# Patient Record
Sex: FEMALE | Race: WHITE | Employment: UNEMPLOYED | URBAN - METROPOLITAN AREA
[De-identification: names, ages, dates, MRNs, and addresses within clinical notes are randomized per-mention and may not be internally consistent; named-entity substitution may affect disease eponyms.]

---

## 2018-10-11 ENCOUNTER — OFFICE VISIT (OUTPATIENT)
Dept: FAMILY MEDICINE CLINIC | Age: 23
End: 2018-10-11
Payer: COMMERCIAL

## 2018-10-11 VITALS
HEART RATE: 85 BPM | OXYGEN SATURATION: 97 % | SYSTOLIC BLOOD PRESSURE: 98 MMHG | WEIGHT: 172 LBS | BODY MASS INDEX: 26.07 KG/M2 | DIASTOLIC BLOOD PRESSURE: 66 MMHG | HEIGHT: 68 IN

## 2018-10-11 DIAGNOSIS — K92.1 BLOOD IN STOOL: Primary | ICD-10-CM

## 2018-10-11 DIAGNOSIS — R42 DIZZINESS: ICD-10-CM

## 2018-10-11 DIAGNOSIS — Z13.220 SCREENING CHOLESTEROL LEVEL: ICD-10-CM

## 2018-10-11 DIAGNOSIS — Z23 IMMUNIZATION DUE: ICD-10-CM

## 2018-10-11 DIAGNOSIS — K58.2 IRRITABLE BOWEL SYNDROME WITH BOTH CONSTIPATION AND DIARRHEA: ICD-10-CM

## 2018-10-11 DIAGNOSIS — K92.1 BLOOD IN STOOL: ICD-10-CM

## 2018-10-11 LAB
A/G RATIO: 1.7 (ref 1.1–2.2)
ALBUMIN SERPL-MCNC: 4.3 G/DL (ref 3.4–5)
ALP BLD-CCNC: 73 U/L (ref 40–129)
ALT SERPL-CCNC: 9 U/L (ref 10–40)
ANION GAP SERPL CALCULATED.3IONS-SCNC: 12 MMOL/L (ref 3–16)
AST SERPL-CCNC: 13 U/L (ref 15–37)
BASOPHILS ABSOLUTE: 0.1 K/UL (ref 0–0.2)
BASOPHILS RELATIVE PERCENT: 0.7 %
BILIRUB SERPL-MCNC: 0.3 MG/DL (ref 0–1)
BUN BLDV-MCNC: 9 MG/DL (ref 7–20)
CALCIUM SERPL-MCNC: 9.4 MG/DL (ref 8.3–10.6)
CHLORIDE BLD-SCNC: 100 MMOL/L (ref 99–110)
CHOLESTEROL, FASTING: 140 MG/DL (ref 0–199)
CO2: 25 MMOL/L (ref 21–32)
CREAT SERPL-MCNC: 0.5 MG/DL (ref 0.6–1.1)
EOSINOPHILS ABSOLUTE: 0.2 K/UL (ref 0–0.6)
EOSINOPHILS RELATIVE PERCENT: 2.4 %
GFR AFRICAN AMERICAN: >60
GFR NON-AFRICAN AMERICAN: >60
GLOBULIN: 2.5 G/DL
GLUCOSE BLD-MCNC: 79 MG/DL (ref 70–99)
HCT VFR BLD CALC: 39.8 % (ref 36–48)
HDLC SERPL-MCNC: 44 MG/DL (ref 40–60)
HEMOGLOBIN: 13.1 G/DL (ref 12–16)
LDL CHOLESTEROL CALCULATED: 82 MG/DL
LYMPHOCYTES ABSOLUTE: 2.4 K/UL (ref 1–5.1)
LYMPHOCYTES RELATIVE PERCENT: 29.9 %
MCH RBC QN AUTO: 28.4 PG (ref 26–34)
MCHC RBC AUTO-ENTMCNC: 32.9 G/DL (ref 31–36)
MCV RBC AUTO: 86.1 FL (ref 80–100)
MONOCYTES ABSOLUTE: 0.5 K/UL (ref 0–1.3)
MONOCYTES RELATIVE PERCENT: 5.9 %
NEUTROPHILS ABSOLUTE: 4.8 K/UL (ref 1.7–7.7)
NEUTROPHILS RELATIVE PERCENT: 61.1 %
PDW BLD-RTO: 12.9 % (ref 12.4–15.4)
PLATELET # BLD: 277 K/UL (ref 135–450)
PMV BLD AUTO: 8.6 FL (ref 5–10.5)
POTASSIUM SERPL-SCNC: 4.3 MMOL/L (ref 3.5–5.1)
RBC # BLD: 4.62 M/UL (ref 4–5.2)
SODIUM BLD-SCNC: 137 MMOL/L (ref 136–145)
TOTAL PROTEIN: 6.8 G/DL (ref 6.4–8.2)
TRIGLYCERIDE, FASTING: 68 MG/DL (ref 0–150)
TSH SERPL DL<=0.05 MIU/L-ACNC: 1.98 UIU/ML (ref 0.27–4.2)
VLDLC SERPL CALC-MCNC: 14 MG/DL
WBC # BLD: 7.9 K/UL (ref 4–11)

## 2018-10-11 PROCEDURE — 90688 IIV4 VACCINE SPLT 0.5 ML IM: CPT | Performed by: NURSE PRACTITIONER

## 2018-10-11 PROCEDURE — 90715 TDAP VACCINE 7 YRS/> IM: CPT | Performed by: NURSE PRACTITIONER

## 2018-10-11 PROCEDURE — 99203 OFFICE O/P NEW LOW 30 MIN: CPT | Performed by: NURSE PRACTITIONER

## 2018-10-11 PROCEDURE — 90471 IMMUNIZATION ADMIN: CPT | Performed by: NURSE PRACTITIONER

## 2018-10-11 PROCEDURE — 90472 IMMUNIZATION ADMIN EACH ADD: CPT | Performed by: NURSE PRACTITIONER

## 2018-10-11 RX ORDER — DICYCLOMINE HCL 20 MG
20 TABLET ORAL 3 TIMES DAILY PRN
Qty: 120 TABLET | Refills: 3 | Status: SHIPPED | OUTPATIENT
Start: 2018-10-11 | End: 2019-01-15 | Stop reason: ALTCHOICE

## 2018-10-11 ASSESSMENT — ENCOUNTER SYMPTOMS
ABDOMINAL PAIN: 0
ANAL BLEEDING: 0
RECTAL PAIN: 0
NAUSEA: 0
CONSTIPATION: 1
STRIDOR: 0
DIARRHEA: 1
WHEEZING: 0
COUGH: 0
SHORTNESS OF BREATH: 0
CHOKING: 0
BLOOD IN STOOL: 1

## 2018-10-11 ASSESSMENT — PATIENT HEALTH QUESTIONNAIRE - PHQ9
SUM OF ALL RESPONSES TO PHQ9 QUESTIONS 1 & 2: 0
1. LITTLE INTEREST OR PLEASURE IN DOING THINGS: 0
SUM OF ALL RESPONSES TO PHQ QUESTIONS 1-9: 0
SUM OF ALL RESPONSES TO PHQ QUESTIONS 1-9: 0
2. FEELING DOWN, DEPRESSED OR HOPELESS: 0

## 2018-10-11 NOTE — PROGRESS NOTES
YRS AND OLDER, IM, MDV, 0.5ML ([facility])  - Tdap (age 6y and older) IM (Boostrix)    5. Screening cholesterol level  - Lipid, Fasting; Future      Patient Instructions   Blood work in [address]  Suggest trying Metamucil a few times weekly  Bentyl three times daily as needed for diarrhea  Follow up in 2 months to see how abdominal issues are going         Outpatient Encounter Prescriptions as of 10/11/2018   Medication Sig Dispense Refill    dicyclomine (BENTYL) 20 MG tablet Take 1 tablet by mouth 3 times daily as needed (diarrhea, cramping) 120 tablet 3     No facility-administered encounter medications on file as of 10/11/2018.       Danica Rosales, CNP

## 2018-10-17 ENCOUNTER — OFFICE VISIT (OUTPATIENT)
Dept: FAMILY MEDICINE CLINIC | Age: 23
End: 2018-10-17
Payer: COMMERCIAL

## 2018-10-17 VITALS
RESPIRATION RATE: 16 BRPM | TEMPERATURE: 98 F | OXYGEN SATURATION: 98 % | HEART RATE: 89 BPM | HEIGHT: 68 IN | SYSTOLIC BLOOD PRESSURE: 104 MMHG | DIASTOLIC BLOOD PRESSURE: 72 MMHG | BODY MASS INDEX: 26.15 KG/M2

## 2018-10-17 DIAGNOSIS — R68.89 FLU-LIKE SYMPTOMS: Primary | ICD-10-CM

## 2018-10-17 LAB — S PYO AG THROAT QL: NORMAL

## 2018-10-17 PROCEDURE — 87880 STREP A ASSAY W/OPTIC: CPT | Performed by: NURSE PRACTITIONER

## 2018-10-17 PROCEDURE — 99213 OFFICE O/P EST LOW 20 MIN: CPT | Performed by: NURSE PRACTITIONER

## 2018-10-17 ASSESSMENT — ENCOUNTER SYMPTOMS
SORE THROAT: 0
STRIDOR: 0
ANAL BLEEDING: 0
COUGH: 1
SINUS PAIN: 0
SINUS PRESSURE: 0
RECTAL PAIN: 0
ABDOMINAL DISTENTION: 0
RHINORRHEA: 0
NAUSEA: 0
CHOKING: 0
CHEST TIGHTNESS: 0
CONSTIPATION: 0
SHORTNESS OF BREATH: 1
WHEEZING: 0
DIARRHEA: 0
BACK PAIN: 0
BLOOD IN STOOL: 0
ABDOMINAL PAIN: 0
VOMITING: 0

## 2018-10-17 NOTE — PATIENT INSTRUCTIONS
Symptoms appear viral  Treat with fluids, rest and PRN ibuprofen/acetaminophen  Follow up if no improvement in 3-4 days or sooner if symptoms worsen

## 2019-01-14 ENCOUNTER — NURSE TRIAGE (OUTPATIENT)
Dept: OTHER | Facility: CLINIC | Age: 24
End: 2019-01-14

## 2019-01-15 ENCOUNTER — OFFICE VISIT (OUTPATIENT)
Dept: FAMILY MEDICINE CLINIC | Age: 24
End: 2019-01-15
Payer: COMMERCIAL

## 2019-01-15 VITALS
BODY MASS INDEX: 25.76 KG/M2 | HEIGHT: 68 IN | WEIGHT: 170 LBS | DIASTOLIC BLOOD PRESSURE: 72 MMHG | HEART RATE: 80 BPM | SYSTOLIC BLOOD PRESSURE: 98 MMHG | OXYGEN SATURATION: 97 %

## 2019-01-15 DIAGNOSIS — G44.209 ACUTE NON INTRACTABLE TENSION-TYPE HEADACHE: ICD-10-CM

## 2019-01-15 DIAGNOSIS — R42 DIZZINESS: Primary | ICD-10-CM

## 2019-01-15 PROCEDURE — 99213 OFFICE O/P EST LOW 20 MIN: CPT | Performed by: NURSE PRACTITIONER

## 2019-01-15 ASSESSMENT — ENCOUNTER SYMPTOMS
COLOR CHANGE: 0
SHORTNESS OF BREATH: 0
WHEEZING: 0
STRIDOR: 0
COUGH: 0

## 2019-04-15 ASSESSMENT — ENCOUNTER SYMPTOMS
EYE REDNESS: 0
CONSTIPATION: 0
SHORTNESS OF BREATH: 0
VOMITING: 0
NAUSEA: 0
DIARRHEA: 0

## 2019-04-16 NOTE — PROGRESS NOTES
04/17/19      Samir January  See HPI for CC    HPI  Here because she is \"having panic attacks\"  New, on no medication for it. For months  Getting worse with time  intermittent attacks  Has less than 3 last month, 8 total over 6 months  To the extreme and then she has to pull over when she is driving. They become more frequent over the past 2 months. It all started to get worse after she had some trauma in her memory and she has been seeing counseling and trying exercises at she is concerned that perhaps that she needs medication. Everyone in her family is on Prozac which seems to help them. That she doesn't want to take medication every day. No thoughts of suicide or harming others. She does have irritable bowel syndrome  Past Medical History:   Diagnosis Date    Irritable bowel syndrome with both constipation and diarrhea 10/11/2018    Low plasma von Willebrand factor (vWF)      Social History     Socioeconomic History    Marital status:      Spouse name: Not on file    Number of children: Not on file    Years of education: Not on file    Highest education level: Not on file   Occupational History    Not on file   Social Needs    Financial resource strain: Not on file    Food insecurity:     Worry: Not on file     Inability: Not on file    Transportation needs:     Medical: Not on file     Non-medical: Not on file   Tobacco Use    Smoking status: Never Smoker    Smokeless tobacco: Never Used   Substance and Sexual Activity    Alcohol use:  Yes    Drug use: No    Sexual activity: Yes   Lifestyle    Physical activity:     Days per week: Not on file     Minutes per session: Not on file    Stress: Not on file   Relationships    Social connections:     Talks on phone: Not on file     Gets together: Not on file     Attends Zoroastrian service: Not on file     Active member of club or organization: Not on file     Attends meetings of clubs or organizations: Not on file     Relationship status: Not on file    Intimate partner violence:     Fear of current or ex partner: Not on file     Emotionally abused: Not on file     Physically abused: Not on file     Forced sexual activity: Not on file   Other Topics Concern    Not on file   Social History Narrative    Not on file           Review of Systems   Constitutional: Negative for unexpected weight change. HENT: Negative. Eyes: Negative for redness. Respiratory: Negative for shortness of breath. Cardiovascular: Negative for chest pain and leg swelling. Gastrointestinal: Negative for constipation, diarrhea, nausea and vomiting. Skin: Negative for pallor. Allergic/Immunologic: Negative for immunocompromised state. Psychiatric/Behavioral: Negative for confusion. The patient is nervous/anxious. All other systems reviewed and are negative. No current outpatient medications on file. No current facility-administered medications for this visit. Vitals:    04/17/19 0804   BP: 114/76   Pulse: 95   SpO2: 97%         Physical Exam  Physical Exam   Constitutional: She is oriented to person, place, and time. She appears well-developed and well-nourished. No distress. HENT:   Head: Normocephalic and atraumatic. Eyes: Conjunctivae are normal. No scleral icterus. Cardiovascular: Normal rate. Pulmonary/Chest: Effort normal. No stridor. Musculoskeletal: She exhibits no edema. Neurological: She is alert and oriented to person, place, and time. Skin: Skin is warm. She is not diaphoretic. No erythema. Psychiatric: Her behavior is normal. Judgment and thought content normal. Her mood appears anxious. Her speech is not rapid and/or pressured and not delayed. She is not slowed. And worried        Diagnosis Orders   1. Panic attacks       With I underlying GA  Spent 20 minutes with pt, >50% of time discussing diagnosis, prognosis, risk and benefits of treatment, treatment plan, side effects, and answered questions.   She is unable to decide exactly what she wants to do this for his medication as she definitely should continue with her therapy. And therapist. She will think about it and the me know whether she wants hydroxyzine or Prozac or even Zoloft and the present cons were discussed on each of these. An electronic signature was used to authenticate this note. This was dictated. Errors mightbe possible due to dictation.   --Lisa Yost, DO

## 2019-04-17 ENCOUNTER — PATIENT MESSAGE (OUTPATIENT)
Dept: FAMILY MEDICINE CLINIC | Age: 24
End: 2019-04-17

## 2019-04-17 ENCOUNTER — OFFICE VISIT (OUTPATIENT)
Dept: FAMILY MEDICINE CLINIC | Age: 24
End: 2019-04-17
Payer: COMMERCIAL

## 2019-04-17 VITALS
HEART RATE: 95 BPM | HEIGHT: 68 IN | DIASTOLIC BLOOD PRESSURE: 76 MMHG | OXYGEN SATURATION: 97 % | WEIGHT: 168 LBS | BODY MASS INDEX: 25.46 KG/M2 | SYSTOLIC BLOOD PRESSURE: 114 MMHG

## 2019-04-17 DIAGNOSIS — F41.0 PANIC ATTACKS: Primary | ICD-10-CM

## 2019-04-17 PROCEDURE — 99214 OFFICE O/P EST MOD 30 MIN: CPT | Performed by: FAMILY MEDICINE

## 2019-04-17 RX ORDER — FLUOXETINE HYDROCHLORIDE 20 MG/1
20 CAPSULE ORAL DAILY
Qty: 90 CAPSULE | Refills: 1 | Status: SHIPPED | OUTPATIENT
Start: 2019-04-17 | End: 2020-02-06

## 2019-04-17 ASSESSMENT — PATIENT HEALTH QUESTIONNAIRE - PHQ9
1. LITTLE INTEREST OR PLEASURE IN DOING THINGS: 0
SUM OF ALL RESPONSES TO PHQ9 QUESTIONS 1 & 2: 2
SUM OF ALL RESPONSES TO PHQ QUESTIONS 1-9: 2
2. FEELING DOWN, DEPRESSED OR HOPELESS: 2
SUM OF ALL RESPONSES TO PHQ QUESTIONS 1-9: 2

## 2019-04-17 NOTE — PATIENT INSTRUCTIONS
· Consider hydroxyzine as needed for panic or prozac or zoloft daily for prevention of panic attack. · Call and let me know if you want to begin either and I can send to your pharmacy. · Usually, starting dose and increase after 4-6 weeks on prozac or zoloft  · For hydroxyzine usually start at 25mg and increase as needed. ·           Use relaxation techniques. Sometimes we simply cannot control the chaos of life. But we can take charge of how we respond by learning to tap into our own internal relaxation response.   What does this mean? It means setting aside about 5 to 10 minutes (daily if possible) to practice one or more of the exercises listed below:  . Connect with Your Bodys Relaxation Response  . Lie comfortably on your back or find a comfortable and quiet place to sit. . Close your eyes and breathe gently and naturally through your nose. . Relax all your muscles, starting at your toes, and moving up to the top of your head. . Connect your awareness to your breath. Focus on how your body feels and responds as you fill and empty your lungs with air. . Continue for 5 to 20 minutes. Try to disconnect from any particular goal or agenda and just allow relaxation to occur at its own pace. When distracting thoughts occur, simply acknowledge them and then return to the breath, focusing on the sounds and sensations as you breathe. . When you are done, sit quietly for several minutes, at first with your eyes closed and then open your eyes. Wait a few minutes before standing up. . Do not worry about whether you are successful in achieving a deep level of relaxation. With practice, the relaxation response will likely come with less effort. . Deep Conscious Breathing  . Find a comfortable and quiet place to sit or lie down. If this is not possible, close your eyes and imagine yourself somewhere relaxing such as lying on the beach listening to ocean waves.   . Close and relax your eyes and relax your their weight. Consciously relax them and feel them release. Progress to your upper arms, shoulders, and neck. . Feel your mouth and jaw. Consciously relax them. Pay particular attention to your jaw muscles and unclench if you need to. Feel your mouth and jaw relax. . Feel your eyes, cheeks, and forehead. Sense if there is tension. Consciously relax your eyelids and the skin on your cheeks and forehead, and feel the tension release. . Mentally scan your body. If you find any place that is still tense, then consciously relax that place and let it release. Repeat again as needed--starting with the tips of your toes and then working upward. Guided Imagery--Visualizing Yourself in a Peaceful Setting? . Sit in a comfortable chair or lie comfortably on your back with your eyes closed and relaxed. . Imagine yourself in a favorite, peaceful place. The place may be on a [place] beach with the ocean breezes caressing you, swinging in a hammock in the mountains, or in your own backyard. Any place that you find peaceful and relaxing is OK. . See and feel the surroundings in your peaceful place, hear the peaceful sounds, smell the scents, feel the warmth of the sun or the cool of the breeze and any other sensations that you find. Relax and enjoy it. . You can return to this place any time or during the night when trying to sleep. As you use this place more and more, you will find it easier to relax or fall asleep as this imagery becomes a relaxation conditioner. You may complete this exercise on its own or you can use this exercise after completing any of the other relaxation exercises.

## 2019-04-17 NOTE — TELEPHONE ENCOUNTER
From: Elsa  To: Jay Freeman DO  Sent: 4/17/2019 9:53 AM EDT  Subject: Prescription Question    Good morning,    Following my visit this morning, I spoke with my family and therapist. We are all in agreement that it would be beneficial to move forward with Prozac to treat my anxiety and panic. Thank you!   Elsa

## 2019-05-23 RX ORDER — HYDROXYZINE HYDROCHLORIDE 25 MG/1
25 TABLET, FILM COATED ORAL DAILY PRN
Qty: 30 TABLET | Refills: 0 | Status: SHIPPED | OUTPATIENT
Start: 2019-05-23 | End: 2019-06-22

## 2019-08-01 ENCOUNTER — OFFICE VISIT (OUTPATIENT)
Dept: FAMILY MEDICINE CLINIC | Age: 24
End: 2019-08-01
Payer: COMMERCIAL

## 2019-08-01 VITALS
HEIGHT: 68 IN | OXYGEN SATURATION: 98 % | BODY MASS INDEX: 26.07 KG/M2 | HEART RATE: 72 BPM | SYSTOLIC BLOOD PRESSURE: 98 MMHG | DIASTOLIC BLOOD PRESSURE: 70 MMHG | WEIGHT: 172 LBS

## 2019-08-01 DIAGNOSIS — R53.83 FATIGUE, UNSPECIFIED TYPE: Primary | ICD-10-CM

## 2019-08-01 DIAGNOSIS — F41.1 GAD (GENERALIZED ANXIETY DISORDER): ICD-10-CM

## 2019-08-01 LAB
CONTROL: NORMAL
PREGNANCY TEST URINE, POC: NORMAL

## 2019-08-01 PROCEDURE — 81025 URINE PREGNANCY TEST: CPT | Performed by: NURSE PRACTITIONER

## 2019-08-01 PROCEDURE — 99213 OFFICE O/P EST LOW 20 MIN: CPT | Performed by: NURSE PRACTITIONER

## 2019-08-01 NOTE — PROGRESS NOTES
08/01/2019    Chief Complaint   Patient presents with    Fatigue     x 2 .5 wks, headaches. HPI: Trevin Jimenez is a 21 y.o. female who presents with complaints of fatigue and headaches. Fatigue started 2.5 weeks ago, has missed a day of work and gone into work late one day because she feels so exhausted. Her brother has mono, she was seen at health clinic at her place of work and was tested for mono which was negative. She does not live with brother but states \"its not unusual for me to go to my parents house (where her brother lives) and sometimes drink after him. \" Pt had stopped taking Prozac and was taking inconsistently for the past week or so she has been taking medication regularly without improvement in her fatigue. Headaches are mild, tension type headaches. Denies abdominal pain, sore throat, vomiting. Pt is , is on day 23 of menstrual cycle. Not on OCP but uses condoms mostly. Past Medical History:   Diagnosis Date    GA (generalized anxiety disorder) 8/2/2019    Irritable bowel syndrome with both constipation and diarrhea 10/11/2018    Low plasma von Willebrand factor (vWF)        Past Surgical History:   Procedure Laterality Date    BREAST REDUCTION SURGERY         Social History     Tobacco Use    Smoking status: Never Smoker    Smokeless tobacco: Never Used   Substance Use Topics    Alcohol use: Yes    Drug use: No       Family History   Problem Relation Age of Onset    High Blood Pressure Father     Cancer Brother         Lymphoma       Review of Systems   Constitutional: Positive for fatigue. Negative for activity change, appetite change, chills, diaphoresis, fever and unexpected weight change. Respiratory: Negative for cough, choking and shortness of breath. Cardiovascular: Negative for chest pain, palpitations and leg swelling. Gastrointestinal: Positive for nausea (mild).  Negative for abdominal distention, abdominal pain, anal bleeding, blood in stool, constipation and diarrhea. Physical Exam   Constitutional: She is oriented to person, place, and time. She appears well-developed and well-nourished. No distress. HENT:   Head: Normocephalic and atraumatic. Neck: Normal range of motion. Cardiovascular: Normal rate, regular rhythm and normal heart sounds. Pulmonary/Chest: Effort normal and breath sounds normal.   Lymphadenopathy:     She has no cervical adenopathy. Neurological: She is alert and oriented to person, place, and time. Skin: Skin is warm and dry. She is not diaphoretic. Nursing note and vitals reviewed. Vitals:    08/01/19 1603   BP: 98/70   Site: Left Upper Arm   Position: Sitting   Pulse: 72   SpO2: 98%   Weight: 172 lb (78 kg)   Height: 5' 8\" (1.727 m)       Assessment:  1. Fatigue, unspecified type  - POCT urine pregnancy  - CBC Auto Differential; Future  - TSH with Reflex; Future  - АНДРЕЙ BARR VIRUS (EBV) ANTIBODY PANEL I; Future    2. GA (generalized anxiety disorder)      Check blood work  HCg negative  Discussed taking Prozac daily and not stopping abruptly  Sleep hygiene    Patient Instructions   Blood work      Outpatient Encounter Medications as of 8/1/2019   Medication Sig Dispense Refill    FLUoxetine (PROZAC) 20 MG capsule Take 1 capsule by mouth daily 90 capsule 1     No facility-administered encounter medications on file as of 8/1/2019.             Serge Mejia CNP

## 2019-08-02 DIAGNOSIS — R53.83 FATIGUE, UNSPECIFIED TYPE: ICD-10-CM

## 2019-08-02 PROBLEM — F41.1 GAD (GENERALIZED ANXIETY DISORDER): Status: ACTIVE | Noted: 2019-08-02

## 2019-08-02 LAB
BASOPHILS ABSOLUTE: 0.1 K/UL (ref 0–0.2)
BASOPHILS RELATIVE PERCENT: 0.7 %
EOSINOPHILS ABSOLUTE: 0.2 K/UL (ref 0–0.6)
EOSINOPHILS RELATIVE PERCENT: 1.9 %
HCT VFR BLD CALC: 39.3 % (ref 36–48)
HEMOGLOBIN: 13 G/DL (ref 12–16)
LYMPHOCYTES ABSOLUTE: 2.8 K/UL (ref 1–5.1)
LYMPHOCYTES RELATIVE PERCENT: 36.3 %
MCH RBC QN AUTO: 28.6 PG (ref 26–34)
MCHC RBC AUTO-ENTMCNC: 33.2 G/DL (ref 31–36)
MCV RBC AUTO: 86.2 FL (ref 80–100)
MONOCYTES ABSOLUTE: 0.4 K/UL (ref 0–1.3)
MONOCYTES RELATIVE PERCENT: 5 %
NEUTROPHILS ABSOLUTE: 4.4 K/UL (ref 1.7–7.7)
NEUTROPHILS RELATIVE PERCENT: 56.1 %
PDW BLD-RTO: 13.1 % (ref 12.4–15.4)
PLATELET # BLD: 291 K/UL (ref 135–450)
PMV BLD AUTO: 8.4 FL (ref 5–10.5)
RBC # BLD: 4.56 M/UL (ref 4–5.2)
TSH REFLEX: 2.38 UIU/ML (ref 0.27–4.2)
WBC # BLD: 7.8 K/UL (ref 4–11)

## 2019-08-02 ASSESSMENT — ENCOUNTER SYMPTOMS
DIARRHEA: 0
BLOOD IN STOOL: 0
COUGH: 0
SHORTNESS OF BREATH: 0
CHOKING: 0
CONSTIPATION: 0
ABDOMINAL DISTENTION: 0
NAUSEA: 1
ANAL BLEEDING: 0
ABDOMINAL PAIN: 0

## 2019-08-04 LAB
EPSTEIN BARR VIRUS NUCLEAR AB IGG: <3 U/ML (ref 0–21.9)
EPSTEIN-BARR EARLY ANTIGEN ANTIBODY: <5 U/ML (ref 0–10.9)
EPSTEIN-BARR VCA IGG: <10 U/ML (ref 0–21.9)
EPSTEIN-BARR VCA IGM: <10 U/ML (ref 0–43.9)

## 2019-12-13 ENCOUNTER — OFFICE VISIT (OUTPATIENT)
Dept: FAMILY MEDICINE CLINIC | Age: 24
End: 2019-12-13
Payer: COMMERCIAL

## 2019-12-13 VITALS
SYSTOLIC BLOOD PRESSURE: 118 MMHG | BODY MASS INDEX: 27.34 KG/M2 | OXYGEN SATURATION: 98 % | DIASTOLIC BLOOD PRESSURE: 70 MMHG | WEIGHT: 179.8 LBS | HEART RATE: 75 BPM

## 2019-12-13 DIAGNOSIS — R19.7 DIARRHEA, UNSPECIFIED TYPE: Primary | ICD-10-CM

## 2019-12-13 DIAGNOSIS — R19.7 DIARRHEA, UNSPECIFIED TYPE: ICD-10-CM

## 2019-12-13 LAB
A/G RATIO: 1.8 (ref 1.1–2.2)
ALBUMIN SERPL-MCNC: 4.6 G/DL (ref 3.4–5)
ALP BLD-CCNC: 65 U/L (ref 40–129)
ALT SERPL-CCNC: 11 U/L (ref 10–40)
ANION GAP SERPL CALCULATED.3IONS-SCNC: 16 MMOL/L (ref 3–16)
AST SERPL-CCNC: 13 U/L (ref 15–37)
BASOPHILS ABSOLUTE: 0.1 K/UL (ref 0–0.2)
BASOPHILS RELATIVE PERCENT: 0.8 %
BILIRUB SERPL-MCNC: <0.2 MG/DL (ref 0–1)
BUN BLDV-MCNC: 11 MG/DL (ref 7–20)
CALCIUM SERPL-MCNC: 9.7 MG/DL (ref 8.3–10.6)
CHLORIDE BLD-SCNC: 104 MMOL/L (ref 99–110)
CO2: 23 MMOL/L (ref 21–32)
CREAT SERPL-MCNC: 0.6 MG/DL (ref 0.6–1.1)
EOSINOPHILS ABSOLUTE: 0.2 K/UL (ref 0–0.6)
EOSINOPHILS RELATIVE PERCENT: 2.7 %
GFR AFRICAN AMERICAN: >60
GFR NON-AFRICAN AMERICAN: >60
GLOBULIN: 2.6 G/DL
GLUCOSE BLD-MCNC: 99 MG/DL (ref 70–99)
HCT VFR BLD CALC: 39 % (ref 36–48)
HEMOGLOBIN: 13.3 G/DL (ref 12–16)
LYMPHOCYTES ABSOLUTE: 2.3 K/UL (ref 1–5.1)
LYMPHOCYTES RELATIVE PERCENT: 33 %
MCH RBC QN AUTO: 28.9 PG (ref 26–34)
MCHC RBC AUTO-ENTMCNC: 34.2 G/DL (ref 31–36)
MCV RBC AUTO: 84.4 FL (ref 80–100)
MONOCYTES ABSOLUTE: 0.5 K/UL (ref 0–1.3)
MONOCYTES RELATIVE PERCENT: 6.8 %
NEUTROPHILS ABSOLUTE: 4 K/UL (ref 1.7–7.7)
NEUTROPHILS RELATIVE PERCENT: 56.7 %
PDW BLD-RTO: 13.2 % (ref 12.4–15.4)
PLATELET # BLD: 323 K/UL (ref 135–450)
PMV BLD AUTO: 9.1 FL (ref 5–10.5)
POTASSIUM SERPL-SCNC: 4.4 MMOL/L (ref 3.5–5.1)
RBC # BLD: 4.62 M/UL (ref 4–5.2)
SODIUM BLD-SCNC: 143 MMOL/L (ref 136–145)
TOTAL PROTEIN: 7.2 G/DL (ref 6.4–8.2)
WBC # BLD: 7.1 K/UL (ref 4–11)

## 2019-12-13 PROCEDURE — 99214 OFFICE O/P EST MOD 30 MIN: CPT | Performed by: NURSE PRACTITIONER

## 2019-12-13 RX ORDER — ONDANSETRON 4 MG/1
4 TABLET, ORALLY DISINTEGRATING ORAL 3 TIMES DAILY PRN
Qty: 21 TABLET | Refills: 0 | Status: SHIPPED | OUTPATIENT
Start: 2019-12-13 | End: 2020-03-05

## 2019-12-13 ASSESSMENT — ENCOUNTER SYMPTOMS
NAUSEA: 1
VOMITING: 0
DIARRHEA: 1
RECTAL PAIN: 0
BLOOD IN STOOL: 0
ABDOMINAL PAIN: 0
CONSTIPATION: 0
ANAL BLEEDING: 0
ABDOMINAL DISTENTION: 0

## 2020-02-06 ENCOUNTER — OFFICE VISIT (OUTPATIENT)
Dept: FAMILY MEDICINE CLINIC | Age: 25
End: 2020-02-06
Payer: COMMERCIAL

## 2020-02-06 VITALS
BODY MASS INDEX: 28.95 KG/M2 | OXYGEN SATURATION: 96 % | SYSTOLIC BLOOD PRESSURE: 104 MMHG | HEART RATE: 88 BPM | WEIGHT: 191 LBS | HEIGHT: 68 IN | DIASTOLIC BLOOD PRESSURE: 80 MMHG

## 2020-02-06 DIAGNOSIS — E03.9 HYPOTHYROIDISM, UNSPECIFIED TYPE: ICD-10-CM

## 2020-02-06 LAB — TSH REFLEX: 3.41 UIU/ML (ref 0.27–4.2)

## 2020-02-06 PROCEDURE — G8431 POS CLIN DEPRES SCRN F/U DOC: HCPCS | Performed by: NURSE PRACTITIONER

## 2020-02-06 PROCEDURE — 99214 OFFICE O/P EST MOD 30 MIN: CPT | Performed by: NURSE PRACTITIONER

## 2020-02-06 PROCEDURE — G0444 DEPRESSION SCREEN ANNUAL: HCPCS | Performed by: NURSE PRACTITIONER

## 2020-02-06 ASSESSMENT — ENCOUNTER SYMPTOMS
SHORTNESS OF BREATH: 0
DIARRHEA: 0
VOMITING: 0
NAUSEA: 0
COUGH: 0

## 2020-02-06 ASSESSMENT — PATIENT HEALTH QUESTIONNAIRE - PHQ9
2. FEELING DOWN, DEPRESSED OR HOPELESS: 1
8. MOVING OR SPEAKING SO SLOWLY THAT OTHER PEOPLE COULD HAVE NOTICED. OR THE OPPOSITE, BEING SO FIGETY OR RESTLESS THAT YOU HAVE BEEN MOVING AROUND A LOT MORE THAN USUAL: 1
6. FEELING BAD ABOUT YOURSELF - OR THAT YOU ARE A FAILURE OR HAVE LET YOURSELF OR YOUR FAMILY DOWN: 3
10. IF YOU CHECKED OFF ANY PROBLEMS, HOW DIFFICULT HAVE THESE PROBLEMS MADE IT FOR YOU TO DO YOUR WORK, TAKE CARE OF THINGS AT HOME, OR GET ALONG WITH OTHER PEOPLE: 2
4. FEELING TIRED OR HAVING LITTLE ENERGY: 2
SUM OF ALL RESPONSES TO PHQ QUESTIONS 1-9: 14
7. TROUBLE CONCENTRATING ON THINGS, SUCH AS READING THE NEWSPAPER OR WATCHING TELEVISION: 0
5. POOR APPETITE OR OVEREATING: 3
3. TROUBLE FALLING OR STAYING ASLEEP: 2
SUM OF ALL RESPONSES TO PHQ9 QUESTIONS 1 & 2: 3
SUM OF ALL RESPONSES TO PHQ QUESTIONS 1-9: 14
1. LITTLE INTEREST OR PLEASURE IN DOING THINGS: 2
9. THOUGHTS THAT YOU WOULD BE BETTER OFF DEAD, OR OF HURTING YOURSELF: 0

## 2020-02-06 NOTE — PROGRESS NOTES
96%   Weight: 191 lb (86.6 kg)   Height: 5' 8\" (1.727 m)     Estimated body mass index is 29.04 kg/m² as calculated from the following:    Height as of this encounter: 5' 8\" (1.727 m). Weight as of this encounter: 191 lb (86.6 kg). Physical Exam  Vitals signs and nursing note reviewed. Constitutional:       General: She is not in acute distress. Appearance: Normal appearance. She is well-developed and normal weight. She is not ill-appearing, toxic-appearing or diaphoretic. HENT:      Head: Normocephalic and atraumatic. Mouth/Throat:      Pharynx: Uvula midline. Neck:      Musculoskeletal: Full passive range of motion without pain. Thyroid: No thyromegaly. Cardiovascular:      Rate and Rhythm: Normal rate. Heart sounds: S1 normal and S2 normal.   Pulmonary:      Effort: Pulmonary effort is normal. No respiratory distress. Skin:     General: Skin is warm and dry. Nails: There is no clubbing. Neurological:      General: No focal deficit present. Mental Status: She is alert and oriented to person, place, and time. Mental status is at baseline. Cranial Nerves: No cranial nerve deficit. Sensory: No sensory deficit. Psychiatric:         Speech: Speech normal.       ASSESSMENT/PLAN:  1. Current moderate episode of major depressive disorder without prior episode ([facility] [address]  - Positive Screen for Clinical Depression with a Documented Follow-up Plan   - sertraline (ZOLOFT) 50 MG tablet; Take 1 tablet by mouth daily  Dispense: 30 tablet; Refill: 2    2. GA (generalized anxiety disorder)  - Positive Screen for Clinical Depression with a Documented Follow-up Plan   - sertraline (ZOLOFT) 50 MG tablet; Take 1 tablet by mouth daily  Dispense: 30 tablet; Refill: 2    3. Chronic fatigue  - Positive Screen for Clinical Depression with a Documented Follow-up Plan   - sertraline (ZOLOFT) 50 MG tablet; Take 1 tablet by mouth daily  Dispense: 30 tablet; Refill: 2    4. Positive depression screening  - Positive Screen for Clinical Depression with a Documented Follow-up Plan     Will stop Prozac and switch to Zoloft for depression and anxiety. She is hoping to be pregnant in the near future. Discussed that Zoloft is considered safest SSRI for use during pregnancy and depression is not controlled with Prozac. Referred to therapy at [facility] and Sofiya Johnston. Return in about 4 weeks (around 3/5/2020) for Depression, Anxiety. An electronic signature was used to authenticate this note. --LARISA Silva - CNP on 2/6/2020 at 1:26 PM  On the basis of positive PHQ-9 screening (PHQ-9 Total Score: 14), the following plan was implemented: medication prescribed: Zoloft- 50 mg- patient will call for any significant medication side effects or worsening symptoms of depression. Patient will follow-up in 4 week(s) with PCP.

## 2020-03-05 ENCOUNTER — OFFICE VISIT (OUTPATIENT)
Dept: FAMILY MEDICINE CLINIC | Age: 25
End: 2020-03-05
Payer: COMMERCIAL

## 2020-03-05 VITALS
BODY MASS INDEX: 29.4 KG/M2 | WEIGHT: 194 LBS | SYSTOLIC BLOOD PRESSURE: 116 MMHG | HEIGHT: 68 IN | HEART RATE: 71 BPM | OXYGEN SATURATION: 99 % | DIASTOLIC BLOOD PRESSURE: 68 MMHG

## 2020-03-05 PROBLEM — F33.1 MODERATE EPISODE OF RECURRENT MAJOR DEPRESSIVE DISORDER (HCC): Status: ACTIVE | Noted: 2020-03-05

## 2020-03-05 PROCEDURE — 99213 OFFICE O/P EST LOW 20 MIN: CPT | Performed by: NURSE PRACTITIONER

## 2020-03-05 ASSESSMENT — ENCOUNTER SYMPTOMS
COUGH: 0
NAUSEA: 0
VOMITING: 0
DIARRHEA: 0
SHORTNESS OF BREATH: 0

## 2020-03-05 NOTE — PROGRESS NOTES
3/5/2020     Chief Complaint   Patient presents with    Anxiety     doing ok    Depression     Елена Christy (:  1995) is a 25 y.o. female, here for evaluation of the following medical concerns:    HPI   Here for follow up on depression and anxiety. She was switched from Prozac to Zoloft last month. She has been on the medication for about 4 weeks. She states that over the past 6 days she has started to notice an improvement in her mood. Has noticed some slight fatigue since starting the medication but it is tolerable. She is pleased with improvement in her symptoms. Previous HPI 2020:  Depression: on going for years, worsening over the past several months. Taking Fluoxetine 20 mg. Anxiety has been better with Prozac, less panic attacks and those are less severe. Stressor: younger brother (24 years old) undergoing bone marrow transplant for lymphoma, this is his 3rd recurrence. Feels sad, hopeless, low energy. Denies SI/HI. She is seeing a therapist but looking for new provider in her insurance network. States she and her  are no longer using contraceptives and does hope to get pregnant soon. Review of Systems   Constitutional: Negative for chills, fatigue and fever. Respiratory: Negative for cough and shortness of breath. Cardiovascular: Negative for chest pain and leg swelling. Gastrointestinal: Negative for diarrhea, nausea and vomiting. Neurological: Negative for dizziness and headaches. Psychiatric/Behavioral: Positive for decreased concentration and dysphoric mood. Negative for behavioral problems, confusion, hallucinations, self-injury, sleep disturbance and suicidal ideas. The patient is nervous/anxious. The patient is not hyperactive. All other systems reviewed and are negative. Prior to Visit Medications    Medication Sig Taking?  Authorizing Provider   sertraline (ZOLOFT) 50 MG tablet Take 1 tablet by mouth daily Yes LARISA Guerra -

## 2020-03-13 ENCOUNTER — TELEPHONE (OUTPATIENT)
Dept: FAMILY MEDICINE CLINIC | Age: 25
End: 2020-03-13

## 2020-03-13 NOTE — TELEPHONE ENCOUNTER
Pt called in because she has a dry cough and is ache. She doesn't know if she should come in or not but she wants to discuss this with someone from the office. Please advise pt.

## 2020-04-01 ENCOUNTER — OFFICE VISIT (OUTPATIENT)
Dept: PRIMARY CARE CLINIC | Age: 25
End: 2020-04-01
Payer: COMMERCIAL

## 2020-04-01 ENCOUNTER — NURSE TRIAGE (OUTPATIENT)
Dept: OTHER | Facility: CLINIC | Age: 25
End: 2020-04-01

## 2020-04-01 VITALS — TEMPERATURE: 98 F | HEART RATE: 89 BPM | OXYGEN SATURATION: 99 %

## 2020-04-01 PROCEDURE — 99212 OFFICE O/P EST SF 10 MIN: CPT | Performed by: NURSE PRACTITIONER

## 2020-04-01 NOTE — TELEPHONE ENCOUNTER
documented. Instructed patient to call back with worsening symptoms. Soft transfer to pre-service center to schedule appointment as recommended. Please do not respond to the triage nurse through this encounter. Any subsequent communication should be directly with the patient.

## 2020-04-01 NOTE — PROGRESS NOTES
4/1/2020    FLU/COVID-19 CLINIC EVALUATION    HPI  SYMPTOMS:    Symptom duration, days:  [] 1   [] 2   [] 3   [] 4   [] 5   [] 6   [x] 7   [] 8   [] 9   [] 10   [] 11   [] 12   [] 13   [] 14 or greater    Symptom course:   [] Worsening     [] Stable     [] Improving    [] Fevers  [] Symptom (not measured)  [] Measured (Result: )  [] Chills  [x] Cough  [] Coughing up blood  [] Chest Congestion  [] Nasal Congestion  [x] Feeling short of breath  [] Sometimes  [] Frequently  [x] All the time  [] Chest pain  [x] Headaches  []Tolerable  [] Severe  [] Sore throat  [] Muscle aches  [] Nausea  [] Vomiting  []Unable to keep fluids down  [] Diarrhea  []Severe    [] OTHER SYMPTOMS:   fatigue    RISK FACTORS: still working     [] Pregnant or possibly pregnant  [] Age over 61  [] Diabetes  [] Heart disease  [] Asthma  [] COPD/Other chronic lung diseases  [] Active Cancer  [] On Chemotherapy  [] Taking oral steroids  [] History Lymphoma/Leukemia  [] Close contact with a lab confirmed COVID-19 patient within 14 days of symptom onset  [] History of travel from affected geographical areas within 14 days of symptom onset  [] Health Care Worker Exposure no symptoms  [] Health Care Worker Exposure symptomatic      VITALS:  Vitals:    04/01/20 1420   Pulse: 89   Temp: 98 °F (36.7 °C)   SpO2: 99%      PHYSICAL EXAMINATION:        ?Alert ? Oriented to person/place/time  ? No apparent distress ? Speaking in full sentences     TESTS:    POCT FLU:  [] Positive     []Negative  POCT STREP:  [] Positive     []Negative    [] COVID-19 Test sent:      ASSESSMENT:    [] Flu  [] Strep Throat  [x] Uncertain Viral Respiratory Illness  [] Possible COVID-19  [] Exposure COVID-19  [] Other    PLAN:    [x] Discharge home with written instructions for:  [] Flu management  [] Strep throat management  [] Possible COVID-19 exposure with self-quarantine   [x] Possible FNUNP-10 with isolation instructions and management of symptoms  [x] Follow-up with primary care physician or emergency department if worsens  [x] Referred to emergency department for evaluation    [x] Evaluation per physician/nurse practitioner in clinic      An  electronic signature was used to authenticate this note.   Alyssa Chin

## 2020-09-08 ENCOUNTER — TELEPHONE (OUTPATIENT)
Dept: FAMILY MEDICINE CLINIC | Age: 25
End: 2020-09-08

## 2020-09-28 LAB
C. TRACHOMATIS, EXTERNAL RESULT: NEGATIVE
GBS, EXTERNAL RESULT: NEGATIVE
N. GONORRHOEAE, EXTERNAL RESULT: NEGATIVE

## 2020-10-02 ENCOUNTER — NURSE TRIAGE (OUTPATIENT)
Dept: OTHER | Facility: CLINIC | Age: 25
End: 2020-10-02

## 2020-10-02 NOTE — TELEPHONE ENCOUNTER
Reason for Disposition   SEVERE coughing spells (e.g., whooping sound after coughing, vomiting after coughing)    Answer Assessment - Initial Assessment Questions  1. ONSET: \"When did the cough begin? \"       On Sunday afternoon/ga she started with sore throat, then t/o the night she continued with sore throat. Mon AM, she had congestion, runny nose and sneezing. The cough started yesterday and today is a lot worse. 2. SEVERITY: \"How bad is the cough today? \"       More than occasional     3. RESPIRATORY DISTRESS: \"Describe your breathing. \"       No resp distress except she is congested    4. FEVER: \"Do you have a fever? \" If so, ask: \"What is your temperature, how was it measured, and when did it start? \"      No fever     5. HEMOPTYSIS: \"Are you coughing up any blood? \" If so ask: \"How much? \" (flecks, streaks, tablespoons, etc.)      No     6. TREATMENT: \"What have you done so far to treat the cough? \" (e.g., meds, fluids, humidifier)      Tylenol and cough gtts    7. CARDIAC HISTORY: \"Do you have any history of heart disease? \" (e.g., heart attack, congestive heart failure)       Denies     8. LUNG HISTORY: \"Do you have any history of lung disease? \"  (e.g., pulmonary embolus, asthma, emphysema)      Denies     9. PE RISK FACTORS: \"Do you have a history of blood clots? \" (or: recent major surgery, recent prolonged travel, bedridden)      No    10. OTHER SYMPTOMS: \"Do you have any other symptoms? (e.g., runny nose, wheezing, chest pain)        Runny nose , fatigue . She is pregnant     6. PREGNANCY: \"Is there any chance you are pregnant? \" \"When was your last menstrual period? \"        She is pregnant     12. TRAVEL: \"Have you traveled out of the country in the last month? \" (e.g., travel history, exposures)        no    Protocols used: COUGH-ADULT-OH    Pod 1 cinti    Caller reports symptoms as documented above. Caller informed of disposition.   Soft transfer to pre-service center [facility]) to schedule appointment as recommended, as writer did attempt to contact Lia Thrasher, but was unsuccessful. Francy states [facility] is not to take same day apt anymore and will attempt to connect me to the office -  Phi Gonzalez states the office is closed , so pt informed to go to  - pt agreeable. Care advice as documented. Attention Provider: Thank you for allowing me to participate in the care of your patient. The patient was connected to triage in response to information provided to the [facility]. Please do not respond through this encounter as the response is not directed to a shared pool.

## 2020-10-13 LAB
ABO, EXTERNAL RESULT: NORMAL
HEP B, EXTERNAL RESULT: NEGATIVE
HIV, EXTERNAL RESULT: NON REACTIVE
RH FACTOR, EXTERNAL RESULT: NEGATIVE
RPR, EXTERNAL RESULT: NON REACTIVE
RUBELLA TITER, EXTERNAL RESULT: NORMAL

## 2021-03-25 ENCOUNTER — HOSPITAL ENCOUNTER (OUTPATIENT)
Dept: NURSING | Age: 26
Setting detail: INFUSION SERIES
Discharge: HOME | End: 2021-03-25
Payer: COMMERCIAL

## 2021-03-25 VITALS
BODY MASS INDEX: 32.58 KG/M2 | WEIGHT: 215 LBS | DIASTOLIC BLOOD PRESSURE: 78 MMHG | RESPIRATION RATE: 18 BRPM | TEMPERATURE: 97 F | SYSTOLIC BLOOD PRESSURE: 113 MMHG | HEART RATE: 89 BPM | HEIGHT: 68 IN

## 2021-03-25 LAB — RHIG LOT NUMBER: NORMAL

## 2021-03-25 PROCEDURE — 6360000002 HC RX W HCPCS: Performed by: ADVANCED PRACTICE MIDWIFE

## 2021-03-25 PROCEDURE — 96372 THER/PROPH/DIAG INJ SC/IM: CPT

## 2021-03-25 RX ADMIN — HUMAN RHO(D) IMMUNE GLOBULIN 300 MCG: 300 INJECTION, SOLUTION INTRAMUSCULAR at 08:20

## 2021-03-25 ASSESSMENT — PAIN - FUNCTIONAL ASSESSMENT: PAIN_FUNCTIONAL_ASSESSMENT: 0-10

## 2021-03-25 NOTE — PROGRESS NOTES
Pt tolerated injection well, VSS, reviewed discharge instructions with pt, verbalizes understanding, denies any questions, pt waited 15 minutes post injection for observation, pt discharged ambulatory in stable condition

## 2021-04-30 ENCOUNTER — HOSPITAL ENCOUNTER (OUTPATIENT)
Age: 26
Discharge: HOME | End: 2021-05-01
Attending: OBSTETRICS & GYNECOLOGY | Admitting: OBSTETRICS & GYNECOLOGY
Payer: COMMERCIAL

## 2021-04-30 LAB
A/G RATIO: 1 (ref 1.1–2.2)
ALBUMIN SERPL-MCNC: 3.3 G/DL (ref 3.4–5)
ALP BLD-CCNC: 181 U/L (ref 40–129)
ALT SERPL-CCNC: 18 U/L (ref 10–40)
ANION GAP SERPL CALCULATED.3IONS-SCNC: 12 MMOL/L (ref 3–16)
AST SERPL-CCNC: 22 U/L (ref 15–37)
BILIRUB SERPL-MCNC: 0.3 MG/DL (ref 0–1)
BUN BLDV-MCNC: 8 MG/DL (ref 7–20)
CALCIUM SERPL-MCNC: 9.7 MG/DL (ref 8.3–10.6)
CHLORIDE BLD-SCNC: 100 MMOL/L (ref 99–110)
CO2: 22 MMOL/L (ref 21–32)
CREAT SERPL-MCNC: <0.5 MG/DL (ref 0.6–1.1)
GFR AFRICAN AMERICAN: >60
GFR NON-AFRICAN AMERICAN: >60
GLOBULIN: 3.2 G/DL
GLUCOSE BLD-MCNC: 121 MG/DL (ref 70–99)
POTASSIUM SERPL-SCNC: 3.7 MMOL/L (ref 3.5–5.1)
SODIUM BLD-SCNC: 134 MMOL/L (ref 136–145)
TOTAL PROTEIN: 6.5 G/DL (ref 6.4–8.2)

## 2021-04-30 PROCEDURE — 82239 BILE ACIDS TOTAL: CPT

## 2021-04-30 PROCEDURE — 80053 COMPREHEN METABOLIC PANEL: CPT

## 2021-05-01 VITALS
SYSTOLIC BLOOD PRESSURE: 117 MMHG | DIASTOLIC BLOOD PRESSURE: 71 MMHG | OXYGEN SATURATION: 98 % | RESPIRATION RATE: 18 BRPM | HEART RATE: 93 BPM | BODY MASS INDEX: 33.04 KG/M2 | HEIGHT: 68 IN | WEIGHT: 218 LBS | TEMPERATURE: 98.3 F

## 2021-05-01 PROCEDURE — 99211 OFF/OP EST MAY X REQ PHY/QHP: CPT

## 2021-05-01 NOTE — PROGRESS NOTES
Dr. Rudolph Melvin at bedside to review care plan with patient. At this time patient to be discharged home. Provider to place order.

## 2021-05-01 NOTE — PROGRESS NOTES
D/c instructions given. Pt verbalized understanding and denied questions. Pt left OB unit @ 0031 in stable condition, ambulatory and undelivered. Not in active labor. Accompanied by father of baby.

## 2021-05-01 NOTE — H&P
[facility] CENTER and Delivery Triage Note        CHIEF COMPLAINT:  Itching     HISTORY OF PRESENT ILLNESS:      The patient is a 22 y.o. female 26w5d. OB History        1    Para        Term                AB        Living           SAB        TAB        Ectopic        Molar        Multiple        Live Births                  23 Yo  at 35wk6d presenting with itching all over her body. Had itching on her abd and a rash last week, resolved with hydrocortisone cream, states now with itching all over including hand feet - not on palms or soles of feel. Has used hydrocortisone cream and benadryl. Also notes lips are either itching or swollen, do not appear swollen. Denies CP/SOB. Good FM, denies Ctx/LOF/VB     Estimated Due Date:  Estimated Date of Delivery: 21    PAST MEDICAL HISTORY:   Past Medical History:   Diagnosis Date    GA (generalized anxiety disorder) 2019    Irritable bowel syndrome with both constipation and diarrhea 10/11/2018    Low plasma von Willebrand factor (vWF)     Moderate episode of recurrent major depressive disorder ([facility] [address] 3/5/2020       PAST  SURGICAL HISTORY:   Past Surgical History:   Procedure Laterality Date    BREAST REDUCTION SURGERY         SOCIAL HISTORY:     reports that she has never smoked. She has never used smokeless tobacco. She reports current alcohol use. She reports that she does not use drugs. MEDICATIONS:    Prior to Admission medications    Medication Sig Start Date End Date Taking? Authorizing Provider   Prenatal Vit-Fe Fumarate-FA (PRENATAL PO) Take by mouth   Yes Historical Provider, MD   sertraline (ZOLOFT) 50 MG tablet Take 1 tablet by mouth daily 20   LARISA Guan - CNP          REVIEW OF SYSTEMS:     See HPI     PHYSICAL EXAM:    Vital Signs: Blood pressure 117/71, pulse 93, temperature 98.3 °F (36.8 °C), temperature source Oral, resp.  rate 18, height 5' 8\" (1.727 m), weight 218 lb (98.9 kg), SpO2 98 %.    GEN: NAD  ABD: soft/ NTTP/ gravid  EXT: nontender    Fetal heart rate:   130s/mod ester/ (+) accels -reactive   Contraction frequency:  quiet      General Labs:      CMP:    Lab Results   Component Value Date     04/30/2021    K 3.7 04/30/2021     04/30/2021    CO2 22 04/30/2021    BUN 8 04/30/2021    CREATININE <0.5 04/30/2021    GFRAA >60 04/30/2021    AGRATIO 1.0 04/30/2021    LABGLOM >60 04/30/2021    GLUCOSE 121 04/30/2021    PROT 6.5 04/30/2021    LABALBU 3.3 04/30/2021    CALCIUM 9.7 04/30/2021    BILITOT 0.3 04/30/2021    ALKPHOS 181 04/30/2021    AST 22 04/30/2021    ALT 18 04/30/2021       ASSESSMENT/PLAN:    35w6d with pruritis all over body   - reactive NST   - bile acids drawn and pending, CMP wnl   - advised can trial zyrtec , will call office Monday to follow up on bile acid results  - reviewed kick counts/call if not hitting counts or sx worsening         Peg Weston  5/1/2021

## 2021-05-01 NOTE — PROGRESS NOTES
Patient present to triage with complaints of itching from head to toe and palms of hands and soles of feet. Patient states she now feels like her lips are swelling and her mouth feels weird. Patient states she has not ate, drank, or been around anything new recently. Patient denies leakage of fluid and vaginal bleeding. Patient states she does feel baby move. Patient placed on EFM at this time. Will call Dr. Magali You to notify of patients arrival to triage.

## 2021-05-02 LAB — BILE ACIDS TOTAL: 10 UMOL/L (ref 0–10)

## 2021-05-11 ENCOUNTER — OFFICE VISIT (OUTPATIENT)
Dept: PRIMARY CARE CLINIC | Age: 26
End: 2021-05-11
Payer: COMMERCIAL

## 2021-05-11 DIAGNOSIS — Z01.818 PREOP TESTING: Primary | ICD-10-CM

## 2021-05-11 LAB — SARS-COV-2: NOT DETECTED

## 2021-05-11 PROCEDURE — 99211 OFF/OP EST MAY X REQ PHY/QHP: CPT | Performed by: NURSE PRACTITIONER

## 2021-05-11 NOTE — PROGRESS NOTES
Hickman received a viral test for COVID-19. They were educated on isolation and quarantine as appropriate. For any symptoms, they were directed to seek care from their PCP, given contact information to establish with a doctor, directed to an urgent care or the emergency room. No

## 2021-05-11 NOTE — PATIENT INSTRUCTIONS

## 2021-06-05 ENCOUNTER — HOSPITAL ENCOUNTER (INPATIENT)
Age: 26
LOS: 2 days | Discharge: HOME | End: 2021-06-08
Attending: OBSTETRICS & GYNECOLOGY | Admitting: OBSTETRICS & GYNECOLOGY
Payer: COMMERCIAL

## 2021-06-06 ENCOUNTER — ANESTHESIA EVENT (OUTPATIENT)
Dept: LABOR AND DELIVERY | Age: 26
End: 2021-06-06
Payer: COMMERCIAL

## 2021-06-06 ENCOUNTER — ANESTHESIA (OUTPATIENT)
Dept: LABOR AND DELIVERY | Age: 26
End: 2021-06-06
Payer: COMMERCIAL

## 2021-06-06 PROBLEM — Z37.9 NORMAL LABOR: Status: ACTIVE | Noted: 2021-06-06

## 2021-06-06 LAB
A/G RATIO: 0.9 (ref 1.1–2.2)
ABO/RH: NORMAL
ALBUMIN SERPL-MCNC: 3.5 G/DL (ref 3.4–5)
ALP BLD-CCNC: 214 U/L (ref 40–129)
ALT SERPL-CCNC: 12 U/L (ref 10–40)
AMPHETAMINE SCREEN, URINE: NORMAL
ANION GAP SERPL CALCULATED.3IONS-SCNC: 12 MMOL/L (ref 3–16)
ANTIBODY SCREEN: NORMAL
APTT: 23.9 SEC (ref 24.2–36.2)
AST SERPL-CCNC: 17 U/L (ref 15–37)
BARBITURATE SCREEN URINE: NORMAL
BASOPHILS ABSOLUTE: 0 K/UL (ref 0–0.2)
BASOPHILS RELATIVE PERCENT: 0.4 %
BENZODIAZEPINE SCREEN, URINE: NORMAL
BILIRUB SERPL-MCNC: <0.2 MG/DL (ref 0–1)
BUN BLDV-MCNC: 9 MG/DL (ref 7–20)
BUPRENORPHINE URINE: NORMAL
CALCIUM SERPL-MCNC: 10.1 MG/DL (ref 8.3–10.6)
CANNABINOID SCREEN URINE: NORMAL
CHLORIDE BLD-SCNC: 102 MMOL/L (ref 99–110)
CO2: 21 MMOL/L (ref 21–32)
COCAINE METABOLITE SCREEN URINE: NORMAL
CREAT SERPL-MCNC: 0.6 MG/DL (ref 0.6–1.1)
CREATININE URINE: 98.7 MG/DL (ref 28–259)
EOSINOPHILS ABSOLUTE: 0.3 K/UL (ref 0–0.6)
EOSINOPHILS RELATIVE PERCENT: 2.9 %
FIBRINOGEN: 734 MG/DL (ref 200–397)
GFR AFRICAN AMERICAN: >60
GFR NON-AFRICAN AMERICAN: >60
GLOBULIN: 3.7 G/DL
GLUCOSE BLD-MCNC: 89 MG/DL (ref 70–99)
HCT VFR BLD CALC: 35.5 % (ref 36–48)
HEMOGLOBIN: 12 G/DL (ref 12–16)
INR BLD: 0.85 (ref 0.86–1.14)
LYMPHOCYTES ABSOLUTE: 3.2 K/UL (ref 1–5.1)
LYMPHOCYTES RELATIVE PERCENT: 29.7 %
Lab: NORMAL
MCH RBC QN AUTO: 26.4 PG (ref 26–34)
MCHC RBC AUTO-ENTMCNC: 33.7 G/DL (ref 31–36)
MCV RBC AUTO: 78.2 FL (ref 80–100)
METHADONE SCREEN, URINE: NORMAL
MONOCYTES ABSOLUTE: 0.7 K/UL (ref 0–1.3)
MONOCYTES RELATIVE PERCENT: 6.3 %
NEUTROPHILS ABSOLUTE: 6.5 K/UL (ref 1.7–7.7)
NEUTROPHILS RELATIVE PERCENT: 60.7 %
OPIATE SCREEN URINE: NORMAL
OXYCODONE URINE: NORMAL
PDW BLD-RTO: 16.1 % (ref 12.4–15.4)
PH UA: 7.5
PHENCYCLIDINE SCREEN URINE: NORMAL
PLATELET # BLD: 267 K/UL (ref 135–450)
PMV BLD AUTO: 9.3 FL (ref 5–10.5)
POTASSIUM SERPL-SCNC: 3.9 MMOL/L (ref 3.5–5.1)
PROPOXYPHENE SCREEN: NORMAL
PROTEIN PROTEIN: 12 MG/DL
PROTEIN/CREAT RATIO: 0.1 MG/DL
PROTHROMBIN TIME: 9.9 SEC (ref 10–13.2)
RBC # BLD: 4.54 M/UL (ref 4–5.2)
SODIUM BLD-SCNC: 135 MMOL/L (ref 136–145)
TOTAL PROTEIN: 7.2 G/DL (ref 6.4–8.2)
TOTAL SYPHILLIS IGG/IGM: NORMAL
URIC ACID, SERUM: 4.4 MG/DL (ref 2.6–6)
WBC # BLD: 10.7 K/UL (ref 4–11)

## 2021-06-06 PROCEDURE — 85730 THROMBOPLASTIN TIME PARTIAL: CPT

## 2021-06-06 PROCEDURE — 1220000000 HC SEMI PRIVATE OB R&B

## 2021-06-06 PROCEDURE — 82570 ASSAY OF URINE CREATININE: CPT

## 2021-06-06 PROCEDURE — 3700000025 EPIDURAL BLOCK: Performed by: ANESTHESIOLOGY

## 2021-06-06 PROCEDURE — 84156 ASSAY OF PROTEIN URINE: CPT

## 2021-06-06 PROCEDURE — 0KQM0ZZ REPAIR PERINEUM MUSCLE, OPEN APPROACH: ICD-10-PCS | Performed by: OBSTETRICS & GYNECOLOGY

## 2021-06-06 PROCEDURE — 80053 COMPREHEN METABOLIC PANEL: CPT

## 2021-06-06 PROCEDURE — 2500000003 HC RX 250 WO HCPCS

## 2021-06-06 PROCEDURE — 6370000000 HC RX 637 (ALT 250 FOR IP)

## 2021-06-06 PROCEDURE — 85384 FIBRINOGEN ACTIVITY: CPT

## 2021-06-06 PROCEDURE — 2500000003 HC RX 250 WO HCPCS: Performed by: NURSE ANESTHETIST, CERTIFIED REGISTERED

## 2021-06-06 PROCEDURE — 6360000002 HC RX W HCPCS: Performed by: OBSTETRICS & GYNECOLOGY

## 2021-06-06 PROCEDURE — 86850 RBC ANTIBODY SCREEN: CPT

## 2021-06-06 PROCEDURE — 80307 DRUG TEST PRSMV CHEM ANLYZR: CPT

## 2021-06-06 PROCEDURE — 51701 INSERT BLADDER CATHETER: CPT

## 2021-06-06 PROCEDURE — 86900 BLOOD TYPING SEROLOGIC ABO: CPT

## 2021-06-06 PROCEDURE — 86780 TREPONEMA PALLIDUM: CPT

## 2021-06-06 PROCEDURE — 2580000003 HC RX 258: Performed by: OBSTETRICS & GYNECOLOGY

## 2021-06-06 PROCEDURE — 84550 ASSAY OF BLOOD/URIC ACID: CPT

## 2021-06-06 PROCEDURE — 85610 PROTHROMBIN TIME: CPT

## 2021-06-06 PROCEDURE — 7200000001 HC VAGINAL DELIVERY

## 2021-06-06 PROCEDURE — 86901 BLOOD TYPING SEROLOGIC RH(D): CPT

## 2021-06-06 PROCEDURE — 85025 COMPLETE CBC W/AUTO DIFF WBC: CPT

## 2021-06-06 RX ORDER — SODIUM CHLORIDE, SODIUM LACTATE, POTASSIUM CHLORIDE, AND CALCIUM CHLORIDE .6; .31; .03; .02 G/100ML; G/100ML; G/100ML; G/100ML
1000 INJECTION, SOLUTION INTRAVENOUS PRN
Status: DISCONTINUED | OUTPATIENT
Start: 2021-06-06 | End: 2021-06-07

## 2021-06-06 RX ORDER — SODIUM CHLORIDE 0.9 % (FLUSH) 0.9 %
5-40 SYRINGE (ML) INJECTION PRN
Status: DISCONTINUED | OUTPATIENT
Start: 2021-06-06 | End: 2021-06-08 | Stop reason: HOSPADM

## 2021-06-06 RX ORDER — FAMOTIDINE 20 MG/1
20 TABLET, FILM COATED ORAL 2 TIMES DAILY
Status: DISCONTINUED | OUTPATIENT
Start: 2021-06-06 | End: 2021-06-08 | Stop reason: HOSPADM

## 2021-06-06 RX ORDER — SODIUM CHLORIDE, SODIUM LACTATE, POTASSIUM CHLORIDE, AND CALCIUM CHLORIDE .6; .31; .03; .02 G/100ML; G/100ML; G/100ML; G/100ML
500 INJECTION, SOLUTION INTRAVENOUS PRN
Status: DISCONTINUED | OUTPATIENT
Start: 2021-06-06 | End: 2021-06-07

## 2021-06-06 RX ORDER — BUPIVACAINE HYDROCHLORIDE 2.5 MG/ML
INJECTION, SOLUTION EPIDURAL; INFILTRATION; INTRACAUDAL PRN
Status: DISCONTINUED | OUTPATIENT
Start: 2021-06-06 | End: 2021-06-06 | Stop reason: SDUPTHER

## 2021-06-06 RX ORDER — ONDANSETRON 2 MG/ML
4 INJECTION INTRAMUSCULAR; INTRAVENOUS EVERY 6 HOURS PRN
Status: DISCONTINUED | OUTPATIENT
Start: 2021-06-06 | End: 2021-06-08 | Stop reason: HOSPADM

## 2021-06-06 RX ORDER — DIPHENHYDRAMINE HCL 25 MG
25 TABLET ORAL EVERY 4 HOURS PRN
Status: DISCONTINUED | OUTPATIENT
Start: 2021-06-06 | End: 2021-06-08 | Stop reason: HOSPADM

## 2021-06-06 RX ORDER — LABETALOL HYDROCHLORIDE 5 MG/ML
20 INJECTION, SOLUTION INTRAVENOUS ONCE
Status: COMPLETED | OUTPATIENT
Start: 2021-06-06 | End: 2021-06-06

## 2021-06-06 RX ORDER — SODIUM CHLORIDE, SODIUM LACTATE, POTASSIUM CHLORIDE, CALCIUM CHLORIDE 600; 310; 30; 20 MG/100ML; MG/100ML; MG/100ML; MG/100ML
INJECTION, SOLUTION INTRAVENOUS CONTINUOUS
Status: DISCONTINUED | OUTPATIENT
Start: 2021-06-06 | End: 2021-06-08 | Stop reason: HOSPADM

## 2021-06-06 RX ORDER — ACETAMINOPHEN 325 MG/1
650 TABLET ORAL EVERY 4 HOURS PRN
Status: DISCONTINUED | OUTPATIENT
Start: 2021-06-06 | End: 2021-06-08 | Stop reason: HOSPADM

## 2021-06-06 RX ORDER — SODIUM CHLORIDE 0.9 % (FLUSH) 0.9 %
5-40 SYRINGE (ML) INJECTION EVERY 12 HOURS SCHEDULED
Status: DISCONTINUED | OUTPATIENT
Start: 2021-06-06 | End: 2021-06-08 | Stop reason: HOSPADM

## 2021-06-06 RX ORDER — FAMOTIDINE 20 MG/1
TABLET, FILM COATED ORAL
Status: COMPLETED
Start: 2021-06-06 | End: 2021-06-06

## 2021-06-06 RX ORDER — SODIUM CHLORIDE 9 MG/ML
25 INJECTION, SOLUTION INTRAVENOUS PRN
Status: DISCONTINUED | OUTPATIENT
Start: 2021-06-06 | End: 2021-06-08 | Stop reason: HOSPADM

## 2021-06-06 RX ORDER — LABETALOL HYDROCHLORIDE 5 MG/ML
INJECTION, SOLUTION INTRAVENOUS
Status: COMPLETED
Start: 2021-06-06 | End: 2021-06-06

## 2021-06-06 RX ADMIN — BUPIVACAINE HYDROCHLORIDE 10 ML: 2.5 INJECTION, SOLUTION EPIDURAL; INFILTRATION; INTRACAUDAL; PERINEURAL at 22:03

## 2021-06-06 RX ADMIN — Medication 15 ML/HR: at 18:19

## 2021-06-06 RX ADMIN — FAMOTIDINE 20 MG: 20 TABLET, FILM COATED ORAL at 02:36

## 2021-06-06 RX ADMIN — Medication 1 MILLI-UNITS/MIN: at 08:35

## 2021-06-06 RX ADMIN — SODIUM CHLORIDE, POTASSIUM CHLORIDE, SODIUM LACTATE AND CALCIUM CHLORIDE: 600; 310; 30; 20 INJECTION, SOLUTION INTRAVENOUS at 01:00

## 2021-06-06 RX ADMIN — LABETALOL HYDROCHLORIDE 20 MG: 5 INJECTION INTRAVENOUS at 03:10

## 2021-06-06 RX ADMIN — LABETALOL HYDROCHLORIDE 20 MG: 5 INJECTION, SOLUTION INTRAVENOUS at 03:10

## 2021-06-06 RX ADMIN — Medication 15 ML/HR: at 01:51

## 2021-06-06 RX ADMIN — BUPIVACAINE HYDROCHLORIDE 6 ML: 2.5 INJECTION, SOLUTION EPIDURAL; INFILTRATION; INTRACAUDAL; PERINEURAL at 01:46

## 2021-06-06 RX ADMIN — SODIUM CHLORIDE, POTASSIUM CHLORIDE, SODIUM LACTATE AND CALCIUM CHLORIDE: 600; 310; 30; 20 INJECTION, SOLUTION INTRAVENOUS at 16:01

## 2021-06-06 RX ADMIN — SODIUM CHLORIDE, POTASSIUM CHLORIDE, SODIUM LACTATE AND CALCIUM CHLORIDE: 600; 310; 30; 20 INJECTION, SOLUTION INTRAVENOUS at 01:50

## 2021-06-06 RX ADMIN — FAMOTIDINE 20 MG: 20 TABLET ORAL at 02:36

## 2021-06-06 NOTE — ANESTHESIA PROCEDURE NOTES
Epidural Block    Patient location during procedure: OB  Start time: 6/6/2021 1:35 AM  End time: 6/6/2021 1:50 AM  Reason for block: labor epidural  Staffing  Performed: resident/CRNA   Anesthesiologist: Jatin Wilson MD  Resident/CRNA: LARISA Stewart CRNA  Preanesthetic Checklist  Completed: patient identified, IV checked, risks and benefits discussed, monitors and equipment checked, pre-op evaluation, timeout performed, anesthesia consent given, oxygen available and patient being monitored  Epidural  Patient position: sitting  Prep: ChloraPrep and site prepped and draped  Patient monitoring: cardiac monitor, continuous pulse ox and frequent blood pressure checks  Approach: midline  Location: lumbar (1-5)  Injection technique: ALL air  Provider prep: mask and sterile gloves  Needle  Needle type: Tuohy   Needle gauge: 17 G  Needle length: 3.5 in  Needle insertion depth: 6 cm  Catheter type: side hole  Catheter size: 19 G (20 G)  Catheter at skin depth: 12 cm  Test dose: negative (3ml 1.5% lido with epi)  Assessment  Sensory level: T8  Hemodynamics: stable  Attempts: 1  Additional Notes  DPE:  25 gauge pencil point needle through touhy X1. Positive CSF.

## 2021-06-06 NOTE — PROGRESS NOTES
Dr. Ju Martinez at bedside. SVE 3/80/-2. Dr. Ju Martinez aware of elevated BP. No new orders at this time. Will continue to monitor.

## 2021-06-06 NOTE — ANESTHESIA PRE PROCEDURE
Department of Anesthesiology  Preprocedure Note       Name:  Ruby Mitchell   Age:  22 y.o.  :  1995                                          MRN:  [id number]         Date:  2021      Surgeon: * No surgeons listed *    Procedure: * No procedures listed *    Medications prior to admission:   Prior to Admission medications    Medication Sig Start Date End Date Taking?  Authorizing Provider   Prenatal Vit-Fe Fumarate-FA (PRENATAL PO) Take by mouth   Yes Historical Provider, MD   sertraline (ZOLOFT) 50 MG tablet Take 1 tablet by mouth daily 20   LARISA Spangler - CNP       Current medications:    Current Facility-Administered Medications   Medication Dose Route Frequency Provider Last Rate Last Admin    lactated ringers infusion   Intravenous Continuous True Livingston MD        lactated ringers bolus  500 mL Intravenous PRABI Livingston MD        Or    lactated ringers bolus  1,000 mL Intravenous PRN True Livingston MD        sodium chloride flush 0.9 % injection 5-40 mL  5-40 mL Intravenous 2 times per day True Livingston MD        sodium chloride flush 0.9 % injection 5-40 mL  5-40 mL Intravenous PRN True Livingston MD        0.9 % sodium chloride infusion  25 mL Intravenous PRABI Livingston MD        ondansetron [facility]) injection 4 mg  4 mg Intravenous Q6H PRN True Livingston MD        diphenhydrAMINE (BENADRYL) tablet 25 mg  25 mg Oral Q4H PRABI Livingston MD        acetaminophen (TYLENOL) tablet 650 mg  650 mg Oral Q4H PRABI Livingston MD        oxytocin (PITOCIN) 10 unit bolus from the bag  10 Units Intravenous PRABI Livingston MD        And    oxytocin (PITOCIN) 30 units in 500 mL infusion  87.3 issa-units/min Intravenous Continuous KIRK Livingston MD        witch hazel-glycerin (TUCKS) pad   Topical PRABI Livingston MD        benzocaine-menthol (DERMOPLAST) 20-0.5 % spray   Topical KIRK Livingston MD         Facility-Administered Medications Ordered in Other Encounters   Medication Dose Route Frequency Provider Last Rate Last Admin    sodium chloride 0.9 % 200 mL with fentaNYL 500 mcg, bupivacaine 0.5% 50 mL (OB) epidural   Epidural Continuous PRN Myrl Grit, APRN - CRNA 15 mL/hr at 06/06/21 0151 15 mL/hr at 06/06/21 0151    bupivacaine (PF) (MARCAINE) 0.25 % injection   Epidural PRN Myrl Grit, APRN - CRNA   6 mL at 06/06/21 0146       Allergies:  No Known Allergies    Problem List:    Patient Active Problem List   Diagnosis Code    Irritable bowel syndrome with both constipation and diarrhea K58.2    Blood in stool K92.1    Fatigue R53.83    GA (generalized anxiety disorder) F41.1    Diarrhea R19.7    Moderate episode of recurrent major depressive disorder ([facility] [address] F33.1    Normal labor O80, Z37.9       Past Medical History:        Diagnosis Date    GA (generalized anxiety disorder) 8/2/2019    Irritable bowel syndrome with both constipation and diarrhea 10/11/2018    Low plasma von Willebrand factor (vWF)     false positive    Moderate episode of recurrent major depressive disorder ([facility] [address] 3/5/2020    Rh negative status during pregnancy        Past Surgical History:        Procedure Laterality Date    BREAST REDUCTION SURGERY  2016       Social History:    Social History     Tobacco Use    Smoking status: Never Smoker    Smokeless tobacco: Never Used   Substance Use Topics    Alcohol use:  Yes                                Counseling given: Not Answered      Vital Signs (Current):   Vitals:    06/06/21 0010   BP: (!) 160/84   Pulse: 97   Resp: 20   Temp: 36.6 °C (97.8 °F)   TempSrc: Oral   Weight: 225 lb (102.1 kg)   Height: 5' 8\" (1.727 m)                                              BP Readings from Last 3 Encounters:   06/06/21 (!) 160/84   04/30/21 117/71   03/25/21 113/78       NPO Status: Time of last liquid consumption: 2330                        Time of last solid consumption: 1930                        Date of last GI/Hepatic/Renal ROS            Endo/Other:    (+) blood dyscrasia (Dx with VWd as a child. Seen by hematology during pregancy and they believe that she does NOT have VWd based on lab values and clinical history.)::., .                 Abdominal:           Vascular: negative vascular ROS. Anesthesia Plan      epidural     ASA 2     (Benefits and risks of MAZIN were discussed and agreed upon. All questions were answered, and verbal consent was obtained.)        Anesthetic plan and risks discussed with patient and spouse.                       LARISA Moura - CRNA   6/6/2021

## 2021-06-06 NOTE — PROGRESS NOTES
Pt comfortable with epidural  FHR tracing reassuring  Ctxns spaced out to q 5 to 7 minutes  Cvx 3-4/90/-1 with some blood y show. Had 2 BPs in severe range so received one dose of IV labetalol; BPs WNL since. PLAN:  Continue labor. Begin Pitocin augmentation.     Brad Mcrae MD

## 2021-06-06 NOTE — PROGRESS NOTES
Dr. Phil Harrison notified of SVE of 3/80/-1. No new orders at this time. Dr. Phil Harrison will reevaluate at 0800.

## 2021-06-06 NOTE — PROGRESS NOTES
Report called to Dr. Evelyn Salazar including VE, pt's level of pain and pt's vital signs. Admission orders received.

## 2021-06-06 NOTE — H&P
Department of Obstetrics and Gynecology   Obstetrics History and Physical        CHIEF COMPLAINT:  contractions    HISTORY OF PRESENT ILLNESS:      The patient is a 22 y.o. female at 37w0d. OB History        1    Para   0    Term   0       0    AB   0    Living   0       SAB   0    TAB   0    Ectopic   0    Molar   0    Multiple   0    Live Births   0            Patient presents with a chief complaint as above and is being admitted for early latent labor    Estimated Due Date: Estimated Date of Delivery: 21    PRENATAL CARE:    Complicated by: none. Seen by Dr. Eliane Saez from Hematology for possible hx of of von Willebrands disease. Labs WNL and no hx of significant bleeding event prior to pregnancy (even with reduction mammoplasty). GBS negatvie. Rh neg and received RhIg. Hx depression-no meds in pregnancy. PAST OB HISTORY  OB History        1    Para   0    Term   0       0    AB   0    Living   0       SAB   0    TAB   0    Ectopic   0    Molar   0    Multiple   0    Live Births   0                Past Medical History:        Diagnosis Date    GA (generalized anxiety disorder) 2019    Irritable bowel syndrome with both constipation and diarrhea 10/11/2018    Low plasma von Willebrand factor (vWF)     false positive    Moderate episode of recurrent major depressive disorder ([facility] [address] 3/5/2020    Rh negative status during pregnancy      Past Surgical History:        Procedure Laterality Date    BREAST REDUCTION SURGERY       Allergies:  Patient has no known allergies.   Social History:    Social History     Socioeconomic History    Marital status:      Spouse name: Not on file    Number of children: Not on file    Years of education: Not on file    Highest education level: Not on file   Occupational History    Not on file   Tobacco Use    Smoking status: Never Smoker    Smokeless tobacco: Never Used   Vaping Use    Vaping Use: Never used   Substance and work of breathing, good air exchange  Abdomen:  Soft, non tender, gravid, consistent with her gestational age, EFW by Leopald's manouever was 8#   Fetal heart rate:  Reassuring. Pelvis:  Adequate pelvis  Cervix: 3 cm 80% soft -2  Contraction frequency:  3 minutes    Membranes:  Intact    ASSESSMENT AND PLAN:    Labor: Admit, anticipate normal delivery, routine labor orders  Fetus: Reassuring  GBS: No  Other: Epidural. Elevated BPs in mild range, but labs WNL. Will treat prn.     Annemarie Gonzales MD 6/6/2021 2:07 AM

## 2021-06-07 LAB
HCT VFR BLD CALC: 28.4 % (ref 36–48)
HEMOGLOBIN: 9.5 G/DL (ref 12–16)
MCH RBC QN AUTO: 26.2 PG (ref 26–34)
MCHC RBC AUTO-ENTMCNC: 33.4 G/DL (ref 31–36)
MCV RBC AUTO: 78.3 FL (ref 80–100)
PDW BLD-RTO: 15.9 % (ref 12.4–15.4)
PLATELET # BLD: 183 K/UL (ref 135–450)
PMV BLD AUTO: 8.8 FL (ref 5–10.5)
RBC # BLD: 3.63 M/UL (ref 4–5.2)
WBC # BLD: 15.1 K/UL (ref 4–11)

## 2021-06-07 PROCEDURE — 6370000000 HC RX 637 (ALT 250 FOR IP): Performed by: OBSTETRICS & GYNECOLOGY

## 2021-06-07 PROCEDURE — 85027 COMPLETE CBC AUTOMATED: CPT

## 2021-06-07 PROCEDURE — 1220000000 HC SEMI PRIVATE OB R&B

## 2021-06-07 PROCEDURE — 36415 COLL VENOUS BLD VENIPUNCTURE: CPT

## 2021-06-07 PROCEDURE — 2580000003 HC RX 258: Performed by: OBSTETRICS & GYNECOLOGY

## 2021-06-07 RX ORDER — SIMETHICONE 80 MG
80 TABLET,CHEWABLE ORAL EVERY 6 HOURS PRN
Status: DISCONTINUED | OUTPATIENT
Start: 2021-06-07 | End: 2021-06-08 | Stop reason: HOSPADM

## 2021-06-07 RX ORDER — DOCUSATE SODIUM 100 MG/1
100 CAPSULE, LIQUID FILLED ORAL 2 TIMES DAILY PRN
Status: DISCONTINUED | OUTPATIENT
Start: 2021-06-07 | End: 2021-06-08 | Stop reason: HOSPADM

## 2021-06-07 RX ORDER — ACETAMINOPHEN 325 MG/1
650 TABLET ORAL EVERY 4 HOURS PRN
Status: DISCONTINUED | OUTPATIENT
Start: 2021-06-07 | End: 2021-06-07 | Stop reason: SDUPTHER

## 2021-06-07 RX ORDER — IBUPROFEN 800 MG/1
800 TABLET ORAL EVERY 6 HOURS PRN
Status: DISCONTINUED | OUTPATIENT
Start: 2021-06-07 | End: 2021-06-08 | Stop reason: HOSPADM

## 2021-06-07 RX ORDER — FERROUS SULFATE 325(65) MG
325 TABLET ORAL 2 TIMES DAILY WITH MEALS
Status: DISCONTINUED | OUTPATIENT
Start: 2021-06-07 | End: 2021-06-08 | Stop reason: HOSPADM

## 2021-06-07 RX ORDER — LANOLIN 100 %
OINTMENT (GRAM) TOPICAL PRN
Status: DISCONTINUED | OUTPATIENT
Start: 2021-06-07 | End: 2021-06-08 | Stop reason: HOSPADM

## 2021-06-07 RX ADMIN — IBUPROFEN 800 MG: 800 TABLET, FILM COATED ORAL at 10:16

## 2021-06-07 RX ADMIN — ACETAMINOPHEN 650 MG: 325 TABLET ORAL at 20:14

## 2021-06-07 RX ADMIN — DOCUSATE SODIUM 100 MG: 100 CAPSULE, LIQUID FILLED ORAL at 20:14

## 2021-06-07 RX ADMIN — IBUPROFEN 800 MG: 800 TABLET, FILM COATED ORAL at 01:59

## 2021-06-07 RX ADMIN — DOCUSATE SODIUM 100 MG: 100 CAPSULE, LIQUID FILLED ORAL at 10:16

## 2021-06-07 RX ADMIN — HYDROCORTISONE: 25 CREAM TOPICAL at 14:27

## 2021-06-07 RX ADMIN — FERROUS SULFATE TAB 325 MG (65 MG ELEMENTAL FE) 325 MG: 325 (65 FE) TAB at 11:52

## 2021-06-07 RX ADMIN — BENZOCAINE AND LEVOMENTHOL: 200; 5 SPRAY TOPICAL at 02:48

## 2021-06-07 RX ADMIN — Medication 10 ML: at 10:18

## 2021-06-07 RX ADMIN — ACETAMINOPHEN 650 MG: 325 TABLET ORAL at 05:43

## 2021-06-07 RX ADMIN — ACETAMINOPHEN 650 MG: 325 TABLET ORAL at 12:00

## 2021-06-07 RX ADMIN — ACETAMINOPHEN 650 MG: 325 TABLET ORAL at 00:56

## 2021-06-07 RX ADMIN — WITCH HAZEL 40 EACH: 500 SOLUTION RECTAL; TOPICAL at 02:48

## 2021-06-07 RX ADMIN — IBUPROFEN 800 MG: 800 TABLET, FILM COATED ORAL at 16:54

## 2021-06-07 ASSESSMENT — PAIN SCALES - GENERAL
PAINLEVEL_OUTOF10: 4
PAINLEVEL_OUTOF10: 3
PAINLEVEL_OUTOF10: 4
PAINLEVEL_OUTOF10: 2
PAINLEVEL_OUTOF10: 3
PAINLEVEL_OUTOF10: 3
PAINLEVEL_OUTOF10: 4

## 2021-06-07 NOTE — LACTATION NOTE
This note was copied from a baby's chart. Lactation Progress Note      Data: F/u with primip breast feeder, who delivered last night at 2252. Hx: breast reduction surgery. Infant beginning to wake and rooting on consult. Action: Introduced self as [facility] on for this evening and offered much support with latching. Reviewed tips for positioning baby to the breast, reviewing cross cradle position. Infant on/off the breast initially, then obtained COREY with SRS for a few minutes, then slipping off shallow, and struggling to relatch deeply. Assisted with reposition to football hold, and reviewed tips for positioning, breast support, and hand expression of colostrum. Gave tips to promote deep asymmetric latch onto the breast. Infant rooting with wide open mouth, COREY achieved in football hold with SRS, few AS noted. Reassured of COREY observed, and explained how a good latch should look and feel. Instructed how to break the suction of the latch if ever shallow, pinching or painful. Encouraged MOB to notices her nipple shape when baby releases from the breast, and explained that her nipple should be rounded without creasing. Breast feeding education reviewed including breast care, when to expect mature milk supply, expected  feeding behaviors during the first 24-48 hours of life, and how to know infant is getting enough at the breast including appropriate feedings, output, and weight trends. Encouraged much STS, offering the breast exclusively, when infant is first begining to wake and show hunger cues, and every 2-3 hours if baby is sleepy and without cues. Gave tips to wake sleepy baby as needed. Encouraged much STS and hand expression of colostrum when offering the breast. Education provided on what to expect with cluster feeding behaviors, and the important role they play in bringing in and establishing a good milk supply.  Gave tips to promote a good milk supply and encouraged exclusive breast feeding unless

## 2021-06-07 NOTE — L&D DELIVERY SUMMARY NOTE
[address] [address]                            LABOR AND DELIVERY NOTE    PATIENT NAME: Elvis Berg                    :        1995  MED REC NO:   [id number]                          ROOM:       [id number]  ACCOUNT NO:   [de-identified]                           ADMIT DATE: 2021  PROVIDER:     Boston Akhtar MD    DATE OF PROCEDURE:  2021    DELIVERY SUMMARY    The patient is a 80-year-old  1, para 0, who presented at 41  weeks and 0 days in early labor. 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 She was seen by Dr. Stella Rubio from Hematology at [facility] for this. Laboratory studies were normal and there was no history of significant  bleeding events in the patient's life even with prior surgeries. Her  Von Willebrand factor was normal.  She tested negative for group B strep  at 36 weeks. She is Rh negative and did receive RhoGAM.  She does have  a history of depression and anxiety, but did not take any medications  during her pregnancy. On admission, she was having contractions every 3  minutes. She was 3 cm dilated, 80% effaced, -2 station. She requested  and received an epidural for labor analgesia. She did have elevated  blood pressures at the beginning of her labor course with two that were  in the severe range. She was given one dose of 20 mg of labetalol IV  and then the remainder of her blood pressures throughout her labor  course were normal thereafter. She made slow progress. She had  spontaneous rupture of membranes for clear fluid. She was then checked  at 6:30 p.m. and she was found to be completely dilated and +2 station. She began her second stage and pushed for approximately three hours and  then had a spontaneous vaginal delivery of a viable male infant over an  intact perineum.   The infant was vigorous and crying immediately after  delivery. He was placed on the maternal abdomen. Apgars were 8 at one  minute and 9 at five minutes and the birth weight was pending at the  time of this dictation. The placenta was delivered spontaneously. It  was intact and had a three-vessel umbilical cord. Careful inspection of  the cervix, vagina, and perineum following delivery of the placenta  revealed a second-degree perineal laceration. This was repaired with  2-0 and 3-0 Vicryl under the existing epidural anesthetic. Estimated  blood loss from delivery was 150 mL. The patient and her infant  remained in the delivery room in excellent condition. She plans to  breastfeed.         Katy Almaguer MD    D: 06/06/2021 23:26:29       T: 06/07/2021 3:34:57     [id number]  Job#: [id number]     Doc#: [id number]    CC:

## 2021-06-07 NOTE — PLAN OF CARE
Problem: Discharge Planning:  Goal: Discharged to appropriate level of care  Description: Discharged to appropriate level of care  6/7/2021 0531 by Deandra Babb RN  Outcome: Ongoing  6/7/2021 0238 by Kerri Rankin RN  Outcome: Ongoing     Problem: Constipation:  Goal: Bowel elimination is within specified parameters  Description: Bowel elimination is within specified parameters  6/7/2021 0531 by Deandra Babb RN  Outcome: Ongoing  6/7/2021 0238 by Kerri Rankin RN  Outcome: Ongoing     Problem: Fluid Volume - Imbalance:  Goal: Absence of imbalanced fluid volume signs and symptoms  Description: Absence of imbalanced fluid volume signs and symptoms  6/7/2021 0531 by Deandra Babb RN  Outcome: Ongoing  6/7/2021 0238 by Kerri Rankin RN  Outcome: Ongoing  Goal: Absence of postpartum hemorrhage signs and symptoms  Description: Absence of postpartum hemorrhage signs and symptoms  6/7/2021 0531 by Deandra Babb RN  Outcome: Ongoing  6/7/2021 0238 by Kerri Rankin RN  Outcome: Ongoing     Problem: Infection - Risk of, Puerperal Infection:  Goal: Will show no infection signs and symptoms  Description: Will show no infection signs and symptoms  6/7/2021 0531 by Deandra Babb RN  Outcome: Ongoing  6/7/2021 0238 by Kerri Rankin RN  Outcome: Ongoing     Problem: Mood - Altered:  Goal: Mood stable  Description: Mood stable  6/7/2021 0531 by Deandra Babb RN  Outcome: Ongoing  6/7/2021 0238 by Kerri Rankin RN  Outcome: Ongoing     Problem: Pain - Acute:  Goal: Pain level will decrease  Description: Pain level will decrease  6/7/2021 0531 by Deandra Babb RN  Outcome: Ongoing  6/7/2021 0238 by Kerri Rankin RN  Outcome: Ongoing

## 2021-06-07 NOTE — PROGRESS NOTES
Pt assisted by 2 staff members to restroom for first time get up. Pt denies dizziness or lightheadedness. Gait steady. Pt voided 150 mL urine without difficulty. Pericare done by pt with RN instruction. New ice pack, pad and panties put on pt. Gown changed. Hand hygiene done. Complete linen change done and comfort pad put on bed. Pt tolerated well.

## 2021-06-07 NOTE — PROGRESS NOTES
Pt has been pushing since 1949  Contractions q 2-4 mins  FHR tracing Cat 2  Minimal labial separation with pushing    PLAN:  Continue 2nd stage    X.  Raulito Farmer MD (0) independent

## 2021-06-07 NOTE — PROGRESS NOTES
Department of Obstetrics and Gynecology  Labor and Delivery  Attending Post Partum Progress Note      SUBJECTIVE:  Pt without complaints, pain controlled, tolerating po, lochia wnl, currently breast feeding. OBJECTIVE:      Vitals:  Vitals:    21 1136   BP: 136/81   Pulse: 98   Resp: 18   Temp: 97.7 °F (36.5 °C)       RRR CTAB  ABDOMEN:  soft, non-distended, non-tender, + BS  FF below umbilicus  EXT: no edema    DATA:    CBC:    Lab Results   Component Value Date    WBC 15.1 2021    HGB 9.5 2021    HCT 28.4 2021     2021       ASSESSMENT & PLAN:    22 y.o.   OB History        1    Para   1    Term   1       0    AB   0    Living   1       SAB   0    TAB   0    Ectopic   0    Molar   0    Multiple   0    Live Births   1             s/p  ppd# 1  1. Doing well, continue routine post-partum care. 2.  Anemia of acute blood loss:  PNV + Fe.  3. Does not desire male infant circumcised.

## 2021-06-07 NOTE — LACTATION NOTE
This note was copied from a baby's chart. Lactation Progress Note      Data:   RN requesting 1923 [facility] assistance with 1/0 breast feeder who delivered last night. Mob had breast reduction surgery in 2016. States that she has been able to express colostrum from the right breast but not the left. Mob is aware of potential for insufficient milk supply. Action: Assisted with good position at breast. Not able to express colostrum at this time. Baby fussy at breast but eventually able latch for several good suck bursts. Explained that baby would not be supplemented unless medically indicated or for parental request. Explained that blood sugar, output and wt loss would be observed per staff. Reassured that many babies struggle with a good sustained latch initially. Also explained that she may not make 100% of baby's milk requirements but she may make 25-50%. Encouraged to allow baby to go to breast ad raya to encourage maximum breast stimulation. Also stressed importance of a good deep latch and encouraged to use lactation support prn.  number on board for f/u. Response: Pleased with feed. Verbalized and demonstrated understanding.

## 2021-06-08 VITALS
SYSTOLIC BLOOD PRESSURE: 109 MMHG | RESPIRATION RATE: 18 BRPM | BODY MASS INDEX: 34.1 KG/M2 | TEMPERATURE: 97.8 F | DIASTOLIC BLOOD PRESSURE: 68 MMHG | HEIGHT: 68 IN | WEIGHT: 225 LBS | HEART RATE: 83 BPM

## 2021-06-08 PROCEDURE — 6370000000 HC RX 637 (ALT 250 FOR IP): Performed by: OBSTETRICS & GYNECOLOGY

## 2021-06-08 RX ORDER — FERROUS SULFATE 325(65) MG
325 TABLET ORAL
Qty: 30 TABLET | Refills: 3 | Status: ON HOLD | COMMUNITY
Start: 2021-06-08 | End: 2022-10-20 | Stop reason: HOSPADM

## 2021-06-08 RX ORDER — IBUPROFEN 800 MG/1
800 TABLET ORAL EVERY 6 HOURS PRN
Qty: 120 TABLET | Refills: 3 | Status: ON HOLD | COMMUNITY
Start: 2021-06-08 | End: 2022-10-20 | Stop reason: HOSPADM

## 2021-06-08 RX ADMIN — IBUPROFEN 800 MG: 800 TABLET, FILM COATED ORAL at 00:23

## 2021-06-08 RX ADMIN — ACETAMINOPHEN 650 MG: 325 TABLET ORAL at 09:34

## 2021-06-08 RX ADMIN — IBUPROFEN 800 MG: 800 TABLET, FILM COATED ORAL at 06:33

## 2021-06-08 RX ADMIN — ACETAMINOPHEN 650 MG: 325 TABLET ORAL at 04:04

## 2021-06-08 RX ADMIN — DOCUSATE SODIUM 100 MG: 100 CAPSULE, LIQUID FILLED ORAL at 09:34

## 2021-06-08 RX ADMIN — FERROUS SULFATE TAB 325 MG (65 MG ELEMENTAL FE) 325 MG: 325 (65 FE) TAB at 09:34

## 2021-06-08 ASSESSMENT — PAIN SCALES - GENERAL
PAINLEVEL_OUTOF10: 4
PAINLEVEL_OUTOF10: 5
PAINLEVEL_OUTOF10: 4
PAINLEVEL_OUTOF10: 5

## 2021-06-08 NOTE — LACTATION NOTE
Lactation Progress Note      Data:   F/U on 1/0 breast feeder with a hx of breast reduction surgery in 2016. Mob reports that baby is latching well but seems more lethargic today. Output and wt loss are WNL. Baby has fed at least every 3 hours. Action: Well fed baby check list provided. Also reviewed indications for supplementation. Suggested that she could pump after breast feeds during the day for the first few weeks to maximize breast stimulation. Encouraged to f/u with [facility] and outpatient support group for added monitoring of breast feeding success. Much discharge education provided verbally and written. Encouraged to call for f/u LC support prn. Response: Verbalized understanding and comfortable with breast feeding at this time.

## 2021-06-08 NOTE — FLOWSHEET NOTE
Discharge Phone Call Log  Patient Name: Babak Pineda     [facility] Care Provider: Kt Washington MD Discharge Date: 2021    Disposition of baby:    Phone Number: [phone] (home)     Attempts to Contact:  Date:    Nurse  Date:    Nurse  Date:    Nurse    1. Now that you are at home is your pain being well controlled? Y/N   What pain reducing measures are you using? ____________________________________        Information for the patient's :  Leticia Malgorzata [[id number]]   Delivery Method: Vaginal, Spontaneous     2. Are you currently  having any infant feeding issues? Y/N _____________________________ If yes, please explain: __________________________________________________________________  3. If breastfeeding, were you satisfied with the breastfeeding support services offered? Y/N  4.  Have you had to supplement? Y/N If yes, please explain: _____________________________________________________       Did you supplement while in the hospital, or begin formula supplementation at home?________________________________________  5. Did your OB provider offer you information about the benefits of breastfeeding during your prenatal visits? Y/N  6.  Have you made or have you already had your first appointment with the baby's doctor? Y/N If no, do you know when to schedule it? Y/N   7.  Have you scheduled your follow-up appointment? Y/N  If no, do you know when to schedule it? Y/N  8. Did staff discuss safe sleep during your stay? Y/N  Did you see the wall cling posted in your room explaining how to keep you and your baby safe? Y/N  10. Did your nurses and physicians include you in the plan of care, communicating with you respectfully? Y/N If no, please explain __________________________  11. Is there anyone in particular you would like to mention who provided care for you? ________________________________  12. Did your discharge occur in a timely manner?   Y/N If no, please explain __________________________  13. Do you have any other questions or concerns I can address today?  Y/N  __________________________________________________      Teaching During interview :_____________________________________________  ___________________________RN       Date:______________Time:________________

## 2021-06-08 NOTE — DISCHARGE SUMMARY
Department of Obstetrics and Gynecology  Postpartum Discharge Summary      Admit Date: 2021    Admit Diagnosis: Normal labor    Discharge Date: 21    Condition at Discharge: stable    Discharge Diagnoses: spontaneous vaginal delivery    Discharge Disposition:  Home    Service: Obstetrics    Postpartum complications: none    Hospital Course: elevated BP, s/p Labetalol x 1    [place] Data:  Delivery Date:   2021   10:52 PM     Weight   Information for the patient's :  Markell Amor [[id number]]   Birth Weight: 8 lb 3.6 oz (3.731 kg)     Apgars   Information for the patient's :  Vinod Ards, [address] [[id number]]   APGAR One: 8      Apgars   Information for the patient's :  Markell Amor [[id number]]   APGAR Five: 9     Disposition of Baby:  Home with mother    Current Discharge Medication List      START taking these medications    Details   ibuprofen (ADVIL;MOTRIN) 800 MG tablet Take 1 tablet by mouth every 6 hours as needed (Pain level 1 - 10)  Qty: 120 tablet, Refills: 3      ferrous sulfate (IRON 325) 325 (65 Fe) MG tablet Take 1 tablet by mouth daily (with breakfast)  Qty: 30 tablet, Refills: 3         CONTINUE these medications which have NOT CHANGED    Details   Prenatal Vit-Fe Fumarate-FA (PRENATAL PO) Take by mouth      sertraline (ZOLOFT) 50 MG tablet Take 1 tablet by mouth daily  Qty: 30 tablet, Refills: 2    Associated Diagnoses: Current moderate episode of major depressive disorder without prior episode ([facility] [address] GA (generalized anxiety disorder);  Chronic fatigue             Follow-up:  Postpartum appt in 6 weeks        Electronically signed by Tano Christine MD on 2021 at 9:30 AM

## 2021-06-08 NOTE — LACTATION NOTE
This note was copied from a baby's chart. Lactation Progress Note      Data:     MOB requests f/u. Primip breast feeder, 1 pp. Hx: breast reduction surgery with \"keyhole\" incision so that nipple was not removed for the procedure. MOB reports aware of risks to milk supply related to breast surgery and reduction. Reports baby has had 2 good feedings, 2 voids and 2 stools since last consult earlier this evening. MOB states starting to get sore on her right nipple, but feels that baby latched better with the last feeding and was more comfortable. Reports baby is drawing her nipple out to protrude with feedings as well. Action: Reviewed tips to encourage COREY onto the breast. Explained need for deep asymmetric latch, gave tips to achieve, and reviewed how to tell infant is latched properly, explaining how a good latch should look and feel. Encouraged to call for [facility] to assess the latch with the next feeding. Instructed how to break the latch if baby is ever latched shallow, or if the latch is pinching or painful. Instructed MOB that her nipple should appear rounded when baby releases from the breast, without creasing or pinching. MOB also, states feeling encouraged that baby has had several good feedings, she is able to hand express colostrum, and infant with 3 voids and 3 stools this evening total. Gave much praise and reassurance. Gave tips to promote a good milk supply. Encouraged exclusive breast feeding, without pacifiers or formula supplements unless medical indication were to arise. Encouraged much STS, breast massage, and hand expression to support breast feeding and stimulation. Talked about pumping if need for supplementation were to arise. Discussed f/u support available with outpatient lactation clinic and breast feeding support group. Encouraged outpatient f/u prn when mature milk comes in to obtain a weighted feeding and to offer any additional needed lactation support.  Also, discussed [employer] warm line

## 2021-12-10 ENCOUNTER — TELEMEDICINE (OUTPATIENT)
Dept: PRIMARY CARE CLINIC | Age: 26
End: 2021-12-10
Payer: COMMERCIAL

## 2021-12-10 DIAGNOSIS — J06.9 VIRAL URI WITH COUGH: Primary | ICD-10-CM

## 2021-12-10 DIAGNOSIS — J01.00 ACUTE NON-RECURRENT MAXILLARY SINUSITIS: ICD-10-CM

## 2021-12-10 PROCEDURE — 99213 OFFICE O/P EST LOW 20 MIN: CPT | Performed by: NURSE PRACTITIONER

## 2021-12-10 RX ORDER — METHYLPREDNISOLONE 4 MG/1
TABLET ORAL
Qty: 1 KIT | Refills: 0 | Status: SHIPPED | OUTPATIENT
Start: 2021-12-10 | End: 2021-12-16

## 2021-12-10 RX ORDER — FLUTICASONE PROPIONATE 50 MCG
2 SPRAY, SUSPENSION (ML) NASAL DAILY
Qty: 16 G | Refills: 0 | Status: ON HOLD
Start: 2021-12-10 | End: 2022-10-20 | Stop reason: HOSPADM

## 2021-12-10 RX ORDER — GUAIFENESIN 600 MG/1
1200 TABLET, EXTENDED RELEASE ORAL 2 TIMES DAILY
Qty: 40 TABLET | Refills: 0 | Status: SHIPPED | OUTPATIENT
Start: 2021-12-10 | End: 2021-12-20

## 2021-12-10 ASSESSMENT — ENCOUNTER SYMPTOMS
SORE THROAT: 1
RHINORRHEA: 1
COUGH: 1

## 2021-12-10 NOTE — PROGRESS NOTES
João Segovia (:  1995) is a 22 y.o. female,Established patient, here for evaluation of the following chief complaint(s): URI         ASSESSMENT/PLAN:  1. Viral URI with cough  -     methylPREDNISolone (MEDROL DOSEPACK) 4 MG tablet; Take by mouth., Disp-1 kit, R-0Normal  -     fluticasone (FLONASE) 50 MCG/ACT nasal spray; 2 sprays by Each Nostril route daily, Disp-16 g, R-0Normal  -     guaiFENesin (MUCINEX) 600 MG extended release tablet; Take 2 tablets by mouth 2 times daily for 10 days, Disp-40 tablet, R-0Normal      2. Acute non-recurrent maxillary sinusitis  -     methylPREDNISolone (MEDROL DOSEPACK) 4 MG tablet; Take by mouth., Disp-1 kit, R-0Normal  -     fluticasone (FLONASE) 50 MCG/ACT nasal spray; 2 sprays by Each Nostril route daily, Disp-16 g, R-0Normal  -     guaiFENesin (MUCINEX) 600 MG extended release tablet; Take 2 tablets by mouth 2 times daily for 10 days, Disp-40 tablet, R-0Normal    Increase fluids, Rest, Use OTC pain reliever/fever reducer of choice, take steroids with food and a lot of water, Use mucinex for congestion, Take robitussin for cough, Use saline nasal spray, Use a humidifier in your bedroom. Call if no improvement      Return in about 1 week (around 2021) for Follow up from virtual visit for URI. SUBJECTIVE/OBJECTIVE:  URI   This is a new problem. The current episode started in the past 7 days. The problem has been gradually improving. Maximum temperature: initially, not now. The fever has been present for 1 to 2 days. Associated symptoms include congestion, coughing, headaches, a plugged ear sensation (popping ears), rhinorrhea, sneezing and a sore throat. She has tried steam and decongestant (dayquil, mucinex, nyquil, saline) for the symptoms. The treatment provided mild relief. Most of her symptoms have resolved and she still has facial pain, congestion, headache, and drainage with intermittent coughing.      Review of Systems   HENT: Positive for congestion, rhinorrhea, sneezing and sore throat. Respiratory: Positive for cough. Neurological: Positive for headaches. No flowsheet data found. Physical Exam    [INSTRUCTIONS:  \"[x]\" Indicates a positive item  \"[]\" Indicates a negative item  -- DELETE ALL ITEMS NOT EXAMINED]    Constitutional: [x] Appears well-developed and well-nourished [x] No apparent distress      [] Abnormal -     Mental status: [x] Alert and awake  [x] Oriented to person/place/time [x] Able to follow commands    [] Abnormal -     Eyes:   EOM    [x]  Normal    [] Abnormal -   Sclera  [x]  Normal    [] Abnormal -          Discharge [x]  None visible   [] Abnormal -   No periorbital or facial edema    HENT: [x] Normocephalic, atraumatic  [] Abnormal -   [x] Mouth/Throat: Mucous membranes are moist  No tenderness to palpation of sinuses  External Ears [x] Normal  [] Abnormal -    Neck: [x] No visualized mass [] Abnormal -   No lymphadenopathy  Pulmonary/Chest: [x] Respiratory effort normal   [x] No visualized signs of difficulty breathing or respiratory distress        [] Abnormal -      Musculoskeletal:   [x] Normal gait with no signs of ataxia         [x] Normal range of motion of neck        [] Abnormal -     Neurological:        [x] No Facial Asymmetry (Cranial nerve 7 motor function) (limited exam due to video visit)          [x] No gaze palsy        [] Abnormal -          Skin:        [x] No significant exanthematous lesions or discoloration noted on facial skin         [] Abnormal -            Psychiatric:       [x] Normal Affect [] Abnormal -        [x] No Hallucinations              On this date 12/10/2021 I have spent 20 minutes reviewing previous notes, test results and face to face (virtual) with the patient discussing the diagnosis and importance of compliance with the treatment plan as well as documenting on the day of the visit.       Audrey Dwyer, was evaluated through a synchronous (real-time) audio-video encounter. The patient (or guardian if applicable) is aware that this is a billable service. Verbal consent to proceed has been obtained within the past 12 months. The visit was conducted pursuant to the emergency declaration under the [address] [place] [address], [address] [place] [address] authority and the [employer] and [employer] Act. Patient identification was verified, and a caregiver was present when appropriate. The patient was located in a state where the provider was credentialed to provide care. An electronic signature was used to authenticate this note.     --Mahad Glaser, LARISA - CNP

## 2021-12-10 NOTE — Clinical Note
Attempted to call [facility] practice to notify them that patient was seen by virtualist and would need follow up appointment next week per protocol. Unable to get through. Set appt up for first available 12/22/21 with caveat given to call PCP office or send MyChart message if symptoms worsen or fail to improve.

## 2021-12-13 ENCOUNTER — TELEPHONE (OUTPATIENT)
Dept: FAMILY MEDICINE CLINIC | Age: 26
End: 2021-12-13

## 2021-12-13 NOTE — TELEPHONE ENCOUNTER
----- Message from Deny Bradley sent at 12/10/2021  3:04 PM EST -----  Subject: Appointment Request    Reason for Call: Urgent Cough Cold    QUESTIONS  Type of Appointment? Established Patient  Reason for appointment request? No appointments available during search  Additional Information for Provider? Pt has congestion in sinuses and   headaches for 12 days. Needs to schedule a vv. Several attempts were made   to connect her to the office but no one answered. ---------------------------------------------------------------------------  --------------  [employer]  What is the best way for the office to contact you? OK to leave message on   voicemail  Preferred Call Back Phone Number? [phone]  ---------------------------------------------------------------------------  --------------  SCRIPT ANSWERS  Relationship to Patient? Self  Are you currently unable to finish sentences due to any difficulty   breathing? No  Are you unable to swallow liquids? No  Are you having fevers (100.4 or greater), chills, or sweats? No  Do you have COPD, asthma or a chronic lung condition? No  Have your symptoms been present for more than 5 days? Yes   Have you been diagnosed with, awaiting test results for, or told that you   are suspected of having COVID-19 (Coronavirus)? (If patient has tested   negative or was tested as a requirement for work, school, or travel and   not based on symptoms, answer no)? No  Within the past two weeks have you developed any of the following symptoms   (answer no if symptoms have been present longer than 2 weeks or began   more than 2 weeks ago)? Fever or Chills, Cough, Shortness of breath or   difficulty breathing, Loss of taste or smell, Sore throat, Nasal   congestion, Sneezing or runny nose, Fatigue or generalized body aches   (answer no if pain is specific to a body part e.g. back pain), Diarrhea,   Headache?  Yes

## 2022-04-13 ENCOUNTER — NURSE TRIAGE (OUTPATIENT)
Dept: OTHER | Facility: CLINIC | Age: 27
End: 2022-04-13

## 2022-04-13 NOTE — TELEPHONE ENCOUNTER
Received call from Wolf at [facility]- [facility] with Red Flag Complaint. Subjective: Caller states \"heart racing\"     Current Symptoms: Heart racing and dizziness-room spinning, waking up gasping for air but could breathe, heaviness in center of chest does not readiate, been taking benzocaine so doesn't know if it has anything to do with that, currently pregnant-12 weeks, history of panic disorder, short of breathe when walking up stairs, thinks it might be anxiety causing chest discomfort, pulse check 68bmp, repeat 74 bpm.  Feels like heart is beating irregularly    Onset: Last night symptoms started, started again this morning    Associated Symptoms: NA    Pain Severity: 4/10; dull, tight heavines; constant    Temperature: Denies    What has been tried: NA    LMP: NA Pregnant: Yes    Recommended disposition: Go to ED Now    Care advice provided, patient verbalizes understanding; denies any other questions or concerns; instructed to call back for any new or worsening symptoms. Patient/caller agrees to proceed to nearest Emergency Department     Attention Provider: Thank you for allowing me to participate in the care of your patient. The patient was connected to triage in response to information provided to the ECC/PSC. Please do not respond through this encounter as the response is not directed to a shared pool.         Reason for Disposition   Heart beating irregularly or very rapidly    Protocols used: CHEST PAIN-ADULT-[place]

## 2022-08-12 ENCOUNTER — HOSPITAL ENCOUNTER (OUTPATIENT)
Dept: NURSING | Age: 27
Setting detail: INFUSION SERIES
Discharge: HOME | End: 2022-08-12
Payer: COMMERCIAL

## 2022-08-12 VITALS
WEIGHT: 203 LBS | HEART RATE: 71 BPM | TEMPERATURE: 96.8 F | HEIGHT: 68 IN | SYSTOLIC BLOOD PRESSURE: 117 MMHG | BODY MASS INDEX: 30.77 KG/M2 | RESPIRATION RATE: 18 BRPM | DIASTOLIC BLOOD PRESSURE: 67 MMHG | OXYGEN SATURATION: 99 %

## 2022-08-12 LAB — RHIG LOT NUMBER: NORMAL

## 2022-08-12 PROCEDURE — 6360000002 HC RX W HCPCS: Performed by: OBSTETRICS & GYNECOLOGY

## 2022-08-12 PROCEDURE — 99211 OFF/OP EST MAY X REQ PHY/QHP: CPT

## 2022-08-12 PROCEDURE — 96372 THER/PROPH/DIAG INJ SC/IM: CPT

## 2022-08-12 RX ORDER — FAMOTIDINE 20 MG/1
20 TABLET, FILM COATED ORAL 2 TIMES DAILY
Status: ON HOLD | COMMUNITY
End: 2022-10-20 | Stop reason: HOSPADM

## 2022-08-12 RX ADMIN — HUMAN RHO(D) IMMUNE GLOBULIN 300 MCG: 300 INJECTION, SOLUTION INTRAMUSCULAR at 07:57

## 2022-08-12 NOTE — PROGRESS NOTES
Blood consent signed. Pt tolerates injection without and complications. Observed for 15 minutes.  Refused education and discharged to home in stable condition

## 2022-10-19 ENCOUNTER — ANESTHESIA EVENT (OUTPATIENT)
Dept: LABOR AND DELIVERY | Age: 27
End: 2022-10-19
Payer: COMMERCIAL

## 2022-10-19 ENCOUNTER — HOSPITAL ENCOUNTER (INPATIENT)
Age: 27
LOS: 2 days | Discharge: HOME | End: 2022-10-21
Attending: OBSTETRICS & GYNECOLOGY | Admitting: OBSTETRICS & GYNECOLOGY
Payer: COMMERCIAL

## 2022-10-19 ENCOUNTER — APPOINTMENT (OUTPATIENT)
Dept: LABOR AND DELIVERY | Age: 27
End: 2022-10-19
Payer: COMMERCIAL

## 2022-10-19 ENCOUNTER — ANESTHESIA (OUTPATIENT)
Dept: LABOR AND DELIVERY | Age: 27
End: 2022-10-19
Payer: COMMERCIAL

## 2022-10-19 PROBLEM — Z3A.39 39 WEEKS GESTATION OF PREGNANCY: Status: ACTIVE | Noted: 2022-10-19

## 2022-10-19 LAB
ABO/RH: NORMAL
AMPHETAMINE SCREEN, URINE: NORMAL
ANTIBODY IDENTIFICATION: NORMAL
ANTIBODY SCREEN: NORMAL
BARBITURATE SCREEN URINE: NORMAL
BASOPHILS ABSOLUTE: 0 K/UL (ref 0–0.2)
BASOPHILS RELATIVE PERCENT: 0.6 %
BENZODIAZEPINE SCREEN, URINE: NORMAL
BUPRENORPHINE URINE: NORMAL
CANNABINOID SCREEN URINE: NORMAL
COCAINE METABOLITE SCREEN URINE: NORMAL
DAT IGG CAPTURE: NORMAL
EOSINOPHILS ABSOLUTE: 0.2 K/UL (ref 0–0.6)
EOSINOPHILS RELATIVE PERCENT: 2.1 %
FENTANYL SCREEN, URINE: NORMAL
HCT VFR BLD CALC: 33.9 % (ref 36–48)
HEMOGLOBIN: 11.3 G/DL (ref 12–16)
LYMPHOCYTES ABSOLUTE: 1.8 K/UL (ref 1–5.1)
LYMPHOCYTES RELATIVE PERCENT: 24.1 %
Lab: NORMAL
MCH RBC QN AUTO: 25.3 PG (ref 26–34)
MCHC RBC AUTO-ENTMCNC: 33.4 G/DL (ref 31–36)
MCV RBC AUTO: 75.8 FL (ref 80–100)
METHADONE SCREEN, URINE: NORMAL
MONOCYTES ABSOLUTE: 0.5 K/UL (ref 0–1.3)
MONOCYTES RELATIVE PERCENT: 6.6 %
NEUTROPHILS ABSOLUTE: 5 K/UL (ref 1.7–7.7)
NEUTROPHILS RELATIVE PERCENT: 66.6 %
OPIATE SCREEN URINE: NORMAL
OXYCODONE URINE: NORMAL
PDW BLD-RTO: 16.6 % (ref 12.4–15.4)
PH UA: 8
PHENCYCLIDINE SCREEN URINE: NORMAL
PLATELET # BLD: 243 K/UL (ref 135–450)
PMV BLD AUTO: 8.5 FL (ref 5–10.5)
RBC # BLD: 4.48 M/UL (ref 4–5.2)
TOTAL SYPHILLIS IGG/IGM: NORMAL
WBC # BLD: 7.5 K/UL (ref 4–11)

## 2022-10-19 PROCEDURE — 86922 COMPATIBILITY TEST ANTIGLOB: CPT

## 2022-10-19 PROCEDURE — 86901 BLOOD TYPING SEROLOGIC RH(D): CPT

## 2022-10-19 PROCEDURE — 86850 RBC ANTIBODY SCREEN: CPT

## 2022-10-19 PROCEDURE — 2500000003 HC RX 250 WO HCPCS: Performed by: NURSE ANESTHETIST, CERTIFIED REGISTERED

## 2022-10-19 PROCEDURE — 0KQM0ZZ REPAIR PERINEUM MUSCLE, OPEN APPROACH: ICD-10-PCS | Performed by: OBSTETRICS & GYNECOLOGY

## 2022-10-19 PROCEDURE — 86780 TREPONEMA PALLIDUM: CPT

## 2022-10-19 PROCEDURE — 3700000025 EPIDURAL BLOCK: Performed by: ANESTHESIOLOGY

## 2022-10-19 PROCEDURE — 51701 INSERT BLADDER CATHETER: CPT

## 2022-10-19 PROCEDURE — 6360000002 HC RX W HCPCS

## 2022-10-19 PROCEDURE — 85025 COMPLETE CBC W/AUTO DIFF WBC: CPT

## 2022-10-19 PROCEDURE — 1220000000 HC SEMI PRIVATE OB R&B

## 2022-10-19 PROCEDURE — 2580000003 HC RX 258: Performed by: OBSTETRICS & GYNECOLOGY

## 2022-10-19 PROCEDURE — 6360000002 HC RX W HCPCS: Performed by: OBSTETRICS & GYNECOLOGY

## 2022-10-19 PROCEDURE — 86900 BLOOD TYPING SEROLOGIC ABO: CPT

## 2022-10-19 PROCEDURE — 3E033VJ INTRODUCTION OF OTHER HORMONE INTO PERIPHERAL VEIN, PERCUTANEOUS APPROACH: ICD-10-PCS | Performed by: OBSTETRICS & GYNECOLOGY

## 2022-10-19 PROCEDURE — 10907ZC DRAINAGE OF AMNIOTIC FLUID, THERAPEUTIC FROM PRODUCTS OF CONCEPTION, VIA NATURAL OR ARTIFICIAL OPENING: ICD-10-PCS | Performed by: OBSTETRICS & GYNECOLOGY

## 2022-10-19 PROCEDURE — 2500000003 HC RX 250 WO HCPCS

## 2022-10-19 PROCEDURE — 7200000001 HC VAGINAL DELIVERY

## 2022-10-19 PROCEDURE — 86880 COOMBS TEST DIRECT: CPT

## 2022-10-19 PROCEDURE — 86870 RBC ANTIBODY IDENTIFICATION: CPT

## 2022-10-19 PROCEDURE — 80307 DRUG TEST PRSMV CHEM ANLYZR: CPT

## 2022-10-19 RX ORDER — BUPIVACAINE HYDROCHLORIDE 5 MG/ML
INJECTION, SOLUTION EPIDURAL; INTRACAUDAL PRN
Status: DISCONTINUED | OUTPATIENT
Start: 2022-10-19 | End: 2022-10-19 | Stop reason: SDUPTHER

## 2022-10-19 RX ORDER — METHYLERGONOVINE MALEATE 0.2 MG/ML
200 INJECTION INTRAVENOUS PRN
Status: DISCONTINUED | OUTPATIENT
Start: 2022-10-19 | End: 2022-10-19

## 2022-10-19 RX ORDER — IBUPROFEN 800 MG/1
800 TABLET ORAL EVERY 8 HOURS
Status: DISCONTINUED | OUTPATIENT
Start: 2022-10-20 | End: 2022-10-21 | Stop reason: HOSPADM

## 2022-10-19 RX ORDER — FAMOTIDINE 10 MG/ML
20 INJECTION, SOLUTION INTRAVENOUS 2 TIMES DAILY
Status: DISCONTINUED | OUTPATIENT
Start: 2022-10-19 | End: 2022-10-19

## 2022-10-19 RX ORDER — CARBOPROST TROMETHAMINE 250 UG/ML
250 INJECTION, SOLUTION INTRAMUSCULAR PRN
Status: DISCONTINUED | OUTPATIENT
Start: 2022-10-19 | End: 2022-10-19

## 2022-10-19 RX ORDER — SODIUM CHLORIDE 9 MG/ML
INJECTION, SOLUTION INTRAVENOUS PRN
Status: DISCONTINUED | OUTPATIENT
Start: 2022-10-19 | End: 2022-10-21 | Stop reason: HOSPADM

## 2022-10-19 RX ORDER — SODIUM CHLORIDE 9 MG/ML
25 INJECTION, SOLUTION INTRAVENOUS PRN
Status: DISCONTINUED | OUTPATIENT
Start: 2022-10-19 | End: 2022-10-19

## 2022-10-19 RX ORDER — DIPHENHYDRAMINE HYDROCHLORIDE 50 MG/ML
25 INJECTION INTRAMUSCULAR; INTRAVENOUS EVERY 4 HOURS PRN
Status: DISCONTINUED | OUTPATIENT
Start: 2022-10-19 | End: 2022-10-19

## 2022-10-19 RX ORDER — BUPIVACAINE HYDROCHLORIDE 2.5 MG/ML
INJECTION, SOLUTION EPIDURAL; INFILTRATION; INTRACAUDAL PRN
Status: DISCONTINUED | OUTPATIENT
Start: 2022-10-19 | End: 2022-10-19

## 2022-10-19 RX ORDER — SODIUM CHLORIDE, SODIUM LACTATE, POTASSIUM CHLORIDE, CALCIUM CHLORIDE 600; 310; 30; 20 MG/100ML; MG/100ML; MG/100ML; MG/100ML
INJECTION, SOLUTION INTRAVENOUS CONTINUOUS
Status: DISCONTINUED | OUTPATIENT
Start: 2022-10-19 | End: 2022-10-19

## 2022-10-19 RX ORDER — BUTORPHANOL TARTRATE 1 MG/ML
1 INJECTION, SOLUTION INTRAMUSCULAR; INTRAVENOUS
Status: DISCONTINUED | OUTPATIENT
Start: 2022-10-19 | End: 2022-10-19

## 2022-10-19 RX ORDER — LIDOCAINE HYDROCHLORIDE 10 MG/ML
30 INJECTION, SOLUTION EPIDURAL; INFILTRATION; INTRACAUDAL; PERINEURAL PRN
Status: DISCONTINUED | OUTPATIENT
Start: 2022-10-19 | End: 2022-10-19

## 2022-10-19 RX ORDER — FAMOTIDINE 10 MG/ML
INJECTION, SOLUTION INTRAVENOUS
Status: COMPLETED
Start: 2022-10-19 | End: 2022-10-19

## 2022-10-19 RX ORDER — KETOROLAC TROMETHAMINE 30 MG/ML
30 INJECTION, SOLUTION INTRAMUSCULAR; INTRAVENOUS EVERY 6 HOURS
Status: COMPLETED | OUTPATIENT
Start: 2022-10-19 | End: 2022-10-20

## 2022-10-19 RX ORDER — SODIUM CHLORIDE 0.9 % (FLUSH) 0.9 %
5-40 SYRINGE (ML) INJECTION EVERY 12 HOURS SCHEDULED
Status: DISCONTINUED | OUTPATIENT
Start: 2022-10-19 | End: 2022-10-21 | Stop reason: HOSPADM

## 2022-10-19 RX ORDER — SODIUM CHLORIDE 0.9 % (FLUSH) 0.9 %
5-40 SYRINGE (ML) INJECTION PRN
Status: DISCONTINUED | OUTPATIENT
Start: 2022-10-19 | End: 2022-10-21 | Stop reason: HOSPADM

## 2022-10-19 RX ORDER — ONDANSETRON 2 MG/ML
4 INJECTION INTRAMUSCULAR; INTRAVENOUS EVERY 6 HOURS PRN
Status: DISCONTINUED | OUTPATIENT
Start: 2022-10-19 | End: 2022-10-19

## 2022-10-19 RX ORDER — MISOPROSTOL 100 UG/1
800 TABLET ORAL PRN
Status: DISCONTINUED | OUTPATIENT
Start: 2022-10-19 | End: 2022-10-19

## 2022-10-19 RX ORDER — ACETAMINOPHEN 500 MG
1000 TABLET ORAL EVERY 8 HOURS
Status: DISCONTINUED | OUTPATIENT
Start: 2022-10-19 | End: 2022-10-21 | Stop reason: HOSPADM

## 2022-10-19 RX ORDER — SODIUM CHLORIDE, SODIUM LACTATE, POTASSIUM CHLORIDE, AND CALCIUM CHLORIDE .6; .31; .03; .02 G/100ML; G/100ML; G/100ML; G/100ML
1000 INJECTION, SOLUTION INTRAVENOUS PRN
Status: DISCONTINUED | OUTPATIENT
Start: 2022-10-19 | End: 2022-10-19

## 2022-10-19 RX ORDER — SODIUM CHLORIDE, SODIUM LACTATE, POTASSIUM CHLORIDE, AND CALCIUM CHLORIDE .6; .31; .03; .02 G/100ML; G/100ML; G/100ML; G/100ML
500 INJECTION, SOLUTION INTRAVENOUS PRN
Status: DISCONTINUED | OUTPATIENT
Start: 2022-10-19 | End: 2022-10-19

## 2022-10-19 RX ORDER — SODIUM CHLORIDE 0.9 % (FLUSH) 0.9 %
5-40 SYRINGE (ML) INJECTION PRN
Status: DISCONTINUED | OUTPATIENT
Start: 2022-10-19 | End: 2022-10-19

## 2022-10-19 RX ORDER — BUPIVACAINE HYDROCHLORIDE 2.5 MG/ML
INJECTION, SOLUTION EPIDURAL; INFILTRATION; INTRACAUDAL PRN
Status: DISCONTINUED | OUTPATIENT
Start: 2022-10-19 | End: 2022-10-19 | Stop reason: SDUPTHER

## 2022-10-19 RX ORDER — ONDANSETRON 2 MG/ML
4 INJECTION INTRAMUSCULAR; INTRAVENOUS EVERY 6 HOURS PRN
Status: DISCONTINUED | OUTPATIENT
Start: 2022-10-19 | End: 2022-10-21 | Stop reason: HOSPADM

## 2022-10-19 RX ORDER — SODIUM CHLORIDE 0.9 % (FLUSH) 0.9 %
5-40 SYRINGE (ML) INJECTION EVERY 12 HOURS SCHEDULED
Status: DISCONTINUED | OUTPATIENT
Start: 2022-10-19 | End: 2022-10-19

## 2022-10-19 RX ORDER — SODIUM CHLORIDE, SODIUM LACTATE, POTASSIUM CHLORIDE, CALCIUM CHLORIDE 600; 310; 30; 20 MG/100ML; MG/100ML; MG/100ML; MG/100ML
INJECTION, SOLUTION INTRAVENOUS CONTINUOUS
Status: DISCONTINUED | OUTPATIENT
Start: 2022-10-19 | End: 2022-10-21 | Stop reason: HOSPADM

## 2022-10-19 RX ORDER — FERROUS SULFATE 325(65) MG
325 TABLET ORAL 2 TIMES DAILY WITH MEALS
Status: DISCONTINUED | OUTPATIENT
Start: 2022-10-20 | End: 2022-10-21 | Stop reason: HOSPADM

## 2022-10-19 RX ORDER — ACETAMINOPHEN 325 MG/1
650 TABLET ORAL EVERY 4 HOURS PRN
Status: DISCONTINUED | OUTPATIENT
Start: 2022-10-19 | End: 2022-10-19

## 2022-10-19 RX ORDER — DOCUSATE SODIUM 100 MG/1
100 CAPSULE, LIQUID FILLED ORAL 2 TIMES DAILY
Status: DISCONTINUED | OUTPATIENT
Start: 2022-10-19 | End: 2022-10-21 | Stop reason: HOSPADM

## 2022-10-19 RX ORDER — METHYLERGONOVINE MALEATE 0.2 MG/ML
200 INJECTION INTRAVENOUS PRN
Status: DISCONTINUED | OUTPATIENT
Start: 2022-10-19 | End: 2022-10-21 | Stop reason: HOSPADM

## 2022-10-19 RX ORDER — LANOLIN 100 %
OINTMENT (GRAM) TOPICAL PRN
Status: DISCONTINUED | OUTPATIENT
Start: 2022-10-19 | End: 2022-10-21 | Stop reason: HOSPADM

## 2022-10-19 RX ADMIN — BUPIVACAINE HYDROCHLORIDE 3.5 ML: 2.5 INJECTION, SOLUTION EPIDURAL; INFILTRATION; INTRACAUDAL; PERINEURAL at 15:07

## 2022-10-19 RX ADMIN — SODIUM CHLORIDE, POTASSIUM CHLORIDE, SODIUM LACTATE AND CALCIUM CHLORIDE: 600; 310; 30; 20 INJECTION, SOLUTION INTRAVENOUS at 15:32

## 2022-10-19 RX ADMIN — BUPIVACAINE HYDROCHLORIDE 3.5 ML: 5 INJECTION, SOLUTION EPIDURAL; INTRACAUDAL; PERINEURAL at 15:07

## 2022-10-19 RX ADMIN — FAMOTIDINE 20 MG: 10 INJECTION, SOLUTION INTRAVENOUS at 09:05

## 2022-10-19 RX ADMIN — SODIUM CHLORIDE, POTASSIUM CHLORIDE, SODIUM LACTATE AND CALCIUM CHLORIDE: 600; 310; 30; 20 INJECTION, SOLUTION INTRAVENOUS at 14:32

## 2022-10-19 RX ADMIN — Medication 15 ML/HR: at 15:14

## 2022-10-19 RX ADMIN — Medication 1 MILLI-UNITS/MIN: at 08:12

## 2022-10-19 RX ADMIN — SODIUM CHLORIDE, POTASSIUM CHLORIDE, SODIUM LACTATE AND CALCIUM CHLORIDE: 600; 310; 30; 20 INJECTION, SOLUTION INTRAVENOUS at 07:35

## 2022-10-19 RX ADMIN — Medication 166.7 ML: at 21:18

## 2022-10-19 RX ADMIN — Medication 87.3 MILLI-UNITS/MIN: at 21:14

## 2022-10-19 ASSESSMENT — PAIN DESCRIPTION - DESCRIPTORS
DESCRIPTORS: CRAMPING

## 2022-10-19 NOTE — H&P
Department of Obstetrics and Gynecology   Obstetrics History and Physical        CHIEF COMPLAINT:  IOL    HISTORY OF PRESENT ILLNESS:      The patient is a 32 y.o. female at 36w3d. OB History          2    Para   1    Term   1       0    AB   0    Living   1         SAB   0    IAB   0    Ectopic   0    Molar   0    Multiple   0    Live Births   1            Patient presents with a chief complaint as above and is being admitted for induction    Estimated Due Date: Estimated Date of Delivery: 10/25/22    PRENATAL CARE:    Complicated by: none, vulvar varicosity-right    PAST OB HISTORY:  OB History          2    Para   1    Term   1       0    AB   0    Living   1         SAB   0    IAB   0    Ectopic   0    Molar   0    Multiple   0    Live Births   1                Past Medical History:        Diagnosis Date    GA (generalized anxiety disorder) 2019    Irritable bowel syndrome with both constipation and diarrhea 10/11/2018    Low plasma von Willebrand factor (vWF)     false positive    Moderate episode of [facility] 3/5/2020    Rh negative status during pregnancy      Past Surgical History:        Procedure Laterality Date    BREAST REDUCTION SURGERY       Allergies:  Patient has no known allergies.     Social History:    Social History     Socioeconomic History    Marital status:      Spouse name: Not on file    Number of children: Not on file    Years of education: Not on file    Highest education level: Not on file   Occupational History    Not on file   Tobacco Use    Smoking status: Never    Smokeless tobacco: Never   Vaping Use    Vaping Use: Never used   Substance and Sexual Activity    Alcohol use: Yes    Drug use: No    Sexual activity: Yes     Partners: Male   Other Topics Concern    Not on file   Social History Narrative    Not on file     Social Determinants of Health     Financial Resource Strain: Not on file   Food Insecurity: Not on file   Transportation Needs: Not on file   Physical Activity: Not on file   Stress: Not on file   Social Connections: Not on file   Intimate Partner Violence: Not on file   Housing Stability: Not on file     Family History:       Problem Relation Age of Onset    High Blood Pressure Father     Cancer Brother         Lymphoma     Medications Prior to Admission:  Medications Prior to Admission: famotidine (PEPCID) 20 MG tablet, Take 20 mg by mouth in the morning and 20 mg before bedtime. fluticasone (FLONASE) 50 MCG/ACT nasal spray, 2 sprays by Each Nostril route daily (Patient not taking: Reported on 8/12/2022)  ibuprofen (ADVIL;MOTRIN) 800 MG tablet, Take 1 tablet by mouth every 6 hours as needed (Pain level 1 - 10) (Patient not taking: Reported on 8/12/2022)  ferrous sulfate (IRON 325) 325 (65 Fe) MG tablet, Take 1 tablet by mouth daily (with breakfast) (Patient not taking: Reported on 8/12/2022)  Prenatal Vit-Fe Fumarate-FA (PRENATAL PO), Take by mouth  sertraline (ZOLOFT) 50 MG tablet, Take 1 tablet by mouth daily (Patient not taking: Reported on 8/12/2022)    REVIEW OF SYSTEMS:    wnl    PHYSICAL EXAM:  Vitals:    10/19/22 0727   BP: 118/72   Pulse: 96   Resp: 16   Temp: 98.1 °F (36.7 °C)   TempSrc: Oral     General appearance:  awake, alert, cooperative, no apparent distress, and appears stated age  Neurologic:  Awake, alert, oriented to name, place and time. Lungs:  No increased work of breathing, good air exchange  Abdomen:  Soft, non tender, gravid, consistent with her gestational age, EFW by Leopold's maneuver was 8#   Fetal heart rate:  Reassuring.   Pelvis:  Adequate pelvis  Cervix: 3/50%/-2  Contraction frequency:  none  Membranes:  Intact    Labs: CBC with Differential:    Lab Results   Component Value Date/Time    WBC 7.5 10/19/2022 07:30 AM    RBC 4.48 10/19/2022 07:30 AM    HGB 11.3 10/19/2022 07:30 AM    HCT 33.9 10/19/2022 07:30 AM     10/19/2022 07:30 AM    MCV 75.8 10/19/2022 07:30 AM    MCH 25.3 10/19/2022 07:30 AM    MCHC 33.4 10/19/2022 07:30 AM    RDW 16.6 10/19/2022 07:30 AM    LYMPHOPCT 24.1 10/19/2022 07:30 AM    MONOPCT 6.6 10/19/2022 07:30 AM    BASOPCT 0.6 10/19/2022 07:30 AM    MONOSABS 0.5 10/19/2022 07:30 AM    LYMPHSABS 1.8 10/19/2022 07:30 AM    EOSABS 0.2 10/19/2022 07:30 AM    BASOSABS 0.0 10/19/2022 07:30 AM       ASSESSMENT AND PLAN:    Labor: Admit, anticipate normal delivery, routine labor orders  Fetus: Reassuring  GBS: No    IOL @ 39 + weeks  H/o vulvar varicosity    Pitocin IOL  Epidural if desires.     Tierra Hayes

## 2022-10-19 NOTE — ANESTHESIA PROCEDURE NOTES
Epidural Block    Patient location during procedure: OB  Start time: 10/19/2022 2:46 PM  End time: 10/19/2022 3:14 PM  Reason for block: labor epidural  Staffing  Performed: resident/CRNA   Resident/CRNA: LARISA Fregoso CRNA  Epidural  Patient position: sitting  Prep: ChloraPrep  Patient monitoring: continuous pulse ox  Approach: midline  Location: L2-3  Injection technique: ALL saline  Provider prep: mask  Needle  Needle type: Tuohy   Needle gauge: 17 G  Needle length: 3.5 in  Needle insertion depth: 7 cm  Catheter type: side hole  Catheter size: 19 G  Catheter at skin depth: 12 cm  Test dose: negativeCatheter Secured: tegaderm and tape  Assessment  Sensory level: T8  Hemodynamics: stable  Attempts: 2  Outcomes: uncomplicated and patient tolerated procedure well  Additional Notes  Sitting, Sterile prep/drape, 1%Xylo at L3-4, 17ga Tuohy attempt w/o success,  1%Xylo at L2-3, 17ga Tuohy w/ALL, 25ga Pencan for w/+CSF for DPE, Pencan removed, Catheter inserted, negative test dose, sterile dressing applied.    Preanesthetic Checklist  Completed: patient identified, IV checked, site marked, risks and benefits discussed, surgical/procedural consents, equipment checked, pre-op evaluation, timeout performed, anesthesia consent given, oxygen available and monitors applied/VS acknowledged

## 2022-10-20 LAB
BLOOD BANK DISPENSE STATUS: NORMAL
BLOOD BANK DISPENSE STATUS: NORMAL
BLOOD BANK PRODUCT CODE: NORMAL
BLOOD BANK PRODUCT CODE: NORMAL
BPU ID: NORMAL
BPU ID: NORMAL
DESCRIPTION BLOOD BANK: NORMAL
DESCRIPTION BLOOD BANK: NORMAL
HCT VFR BLD CALC: 29.3 % (ref 36–48)
HEMOGLOBIN: 9.7 G/DL (ref 12–16)
MCH RBC QN AUTO: 25.1 PG (ref 26–34)
MCHC RBC AUTO-ENTMCNC: 33.2 G/DL (ref 31–36)
MCV RBC AUTO: 75.5 FL (ref 80–100)
PDW BLD-RTO: 16.2 % (ref 12.4–15.4)
PLATELET # BLD: 234 K/UL (ref 135–450)
PMV BLD AUTO: 8.2 FL (ref 5–10.5)
RBC # BLD: 3.88 M/UL (ref 4–5.2)
WBC # BLD: 11 K/UL (ref 4–11)

## 2022-10-20 PROCEDURE — 2580000003 HC RX 258: Performed by: OBSTETRICS & GYNECOLOGY

## 2022-10-20 PROCEDURE — 6370000000 HC RX 637 (ALT 250 FOR IP): Performed by: OBSTETRICS & GYNECOLOGY

## 2022-10-20 PROCEDURE — 1220000000 HC SEMI PRIVATE OB R&B

## 2022-10-20 PROCEDURE — 85027 COMPLETE CBC AUTOMATED: CPT

## 2022-10-20 PROCEDURE — 6360000002 HC RX W HCPCS: Performed by: OBSTETRICS & GYNECOLOGY

## 2022-10-20 PROCEDURE — 36415 COLL VENOUS BLD VENIPUNCTURE: CPT

## 2022-10-20 RX ORDER — IBUPROFEN 800 MG/1
800 TABLET ORAL EVERY 8 HOURS
Qty: 30 TABLET | Refills: 0 | Status: SHIPPED | OUTPATIENT
Start: 2022-10-20 | End: 2022-10-21 | Stop reason: SDUPTHER

## 2022-10-20 RX ADMIN — ACETAMINOPHEN 1000 MG: 500 TABLET ORAL at 05:15

## 2022-10-20 RX ADMIN — DOCUSATE SODIUM 100 MG: 100 CAPSULE, LIQUID FILLED ORAL at 15:00

## 2022-10-20 RX ADMIN — KETOROLAC TROMETHAMINE 30 MG: 30 INJECTION, SOLUTION INTRAMUSCULAR at 06:33

## 2022-10-20 RX ADMIN — KETOROLAC TROMETHAMINE 30 MG: 30 INJECTION, SOLUTION INTRAMUSCULAR at 00:52

## 2022-10-20 RX ADMIN — Medication 10 ML: at 21:09

## 2022-10-20 RX ADMIN — DOCUSATE SODIUM 100 MG: 100 CAPSULE, LIQUID FILLED ORAL at 21:09

## 2022-10-20 RX ADMIN — MOXIFLOXACIN HYDROCHLORIDE 800 MG: 400 TABLET, FILM COATED ORAL at 17:28

## 2022-10-20 RX ADMIN — ACETAMINOPHEN 1000 MG: 500 TABLET ORAL at 14:58

## 2022-10-20 RX ADMIN — ACETAMINOPHEN 1000 MG: 500 TABLET ORAL at 22:48

## 2022-10-20 ASSESSMENT — PAIN DESCRIPTION - LOCATION
LOCATION: ABDOMEN
LOCATION: ABDOMEN

## 2022-10-20 ASSESSMENT — PAIN SCALES - GENERAL
PAINLEVEL_OUTOF10: 4
PAINLEVEL_OUTOF10: 6
PAINLEVEL_OUTOF10: 3
PAINLEVEL_OUTOF10: 3

## 2022-10-20 ASSESSMENT — PAIN - FUNCTIONAL ASSESSMENT: PAIN_FUNCTIONAL_ASSESSMENT: ACTIVITIES ARE NOT PREVENTED

## 2022-10-20 ASSESSMENT — PAIN DESCRIPTION - DESCRIPTORS
DESCRIPTORS: CRAMPING
DESCRIPTORS: CRAMPING

## 2022-10-20 ASSESSMENT — PAIN DESCRIPTION - ORIENTATION: ORIENTATION: LOWER

## 2022-10-20 NOTE — PROGRESS NOTES
Department of Obstetrics and Gynecology  Labor and Delivery  Post Partum Progress Note      SUBJECTIVE:  Reports waking up and noticing cramping and bleeding, yet it is not severe. Denies emotional concerns. Normal lochia. Pain is controlled. Breastfeeding without issues. Baby is well at bedside.     OBJECTIVE:      Vitals:  BP (!) 100/50   Pulse 65   Temp 97.8 °F (36.6 °C) (Oral)   Resp 16   Ht 5' 8\" (1.727 m)   Wt 210 lb (95.3 kg)   SpO2 98%   Breastfeeding Unknown   BMI 31.93 kg/m²     ABDOMEN:  FFNT  GENITAL/URINARY: deferred    DATA:    CBC:    Lab Results   Component Value Date/Time    WBC 7.5 10/19/2022 07:30 AM    RBC 4.48 10/19/2022 07:30 AM    HGB 11.3 10/19/2022 07:30 AM    HCT 33.9 10/19/2022 07:30 AM    MCV 75.8 10/19/2022 07:30 AM    RDW 16.6 10/19/2022 07:30 AM     10/19/2022 07:30 AM       ASSESSMENT & PLAN:    PPD#1 s/p ; postpartum acute blood loss anemia    - doing well; continue postpartum care    Kimberlyn Worley MD

## 2022-10-20 NOTE — PROGRESS NOTES
Patient actively pushing. RN and MD remain in continuous attendance at the bedside. Assessment & evaluation of fetal heart rate ongoing via continuous EFM.

## 2022-10-20 NOTE — L&D DELIVERY SUMMARY NOTE
[facility] [place] [address]                            LABOR AND DELIVERY NOTE    PATIENT NAME: Krunal Lucio                  :        1995  MED REC NO:   [id number]                          ROOM:       6577  ACCOUNT NO:   [de-identified]                           ADMIT DATE: 10/19/2022  PROVIDER:     Jovan Clement MD    DATE OF PROCEDURE:  10/19/2022     26-year-old  2, para 1 who presented at 39 weeks 1 day for an  induction of labor with a favorable cervix. Prenatal course was  uncomplicated. The patient did have a right vulvar varicosity. Group  B beta strep culture was negative. The patient presented on 10/19/2022. Fetal heart tracing was reactive. The patient received Pitocin for induction of labor. She did request  and received an epidural.  Amniotomy was performed for clear fluid. The  patient progressed to second stage and after a 5 minutes second stage  delivered by spontaneous vaginal delivery of a viable female infant with  Apgars 8 at one minute and 9 at five minutes. No episiotomy was  performed, but a second-degree perineal laceration occurred with  delivery. Placenta delivered spontaneously grossly intact with a  three-vessel cord. There were no cervical or vaginal lacerations. Perineal laceration was repaired in a normal fashion with 3-0 Vicryl. Rectovaginal exam after repair noted an intact rectal mucosa and rectal  sphincter. All sponges and needles were accounted for after delivery. Estimated blood loss was 200 mL. The patient named her little girl  ______.         Nico Akbar MD    D: 10/19/2022 21:36:17       T: 10/19/2022 21:38:33     [email or web address]  Job#: [id number]     Doc#: [id number]    CC:

## 2022-10-20 NOTE — BRIEF OP NOTE
Brief Postoperative Note      Patient: Julio Reinoos  YOB: 1995  MRN: [id number]    Date of Procedure:     , VFI  Apgars 8/9  Placenta spon.   cc  2nd perineal lac    \"Zaire Jordan\"    Electronically signed by Oniel Rubalcava MD on 10/19/2022 at 9:31 PM

## 2022-10-20 NOTE — PROGRESS NOTES
RN and MD remained at bedside throughout pushing. EFM continuously assessed. Vaginal delivery of viable infant.

## 2022-10-20 NOTE — LACTATION NOTE
This note was copied from a baby's chart. Lactation Progress Note      Data:   F/u during lactation rounds with multip breast feeder, who delivered last night by . Mother reports that she tried to breast feed her first baby, but never felt her mature milk came in. Maternal Hx: Breast reduction surgery 5 years ago with key hole incision, nipple was removed and reapplied. Mother states she does have sensation to nipples, and c/o pain with latching on the right breast..     Action: Introduced self as LC on for the day and offered support. Reviewed possible causes for painful latch, including shallow latch and possible scar tissue adhesion being stretched/pulled with sucking when latch. Educated on importance of COREY, and gave tips to achieve. Offered to assess latch on consult and mother agrees. States started using a nipple shield for comfort with painful latching. Educated on potential risks of decreased milk transfer and stimulation to the breast with using a nipple shield. Encouraged direct breast feeding, working on obtaining a deep comfortable latch if able. Observed mother position baby STS to the breast in football hold, with baby's body facing away from the breast with head turned in toward the breast. Praise given for STS contact, educating on benefits. Shown to mother infant's position, and gave tips to improve positioning so that baby is belly to belly and nose to nipple with mom to encourage COREY. Encouraged to wait for baby to open mouth widely before latching on deeply for asymmetrical latch on the breast. Infant with wide open mouth, COREY achieved but then began to gag. FOB states baby had just spit up clear emesis prior to this attempt to breast feed. Discussed that baby will likely become more interested once infant has had a chance to work up remaining amniotic fluid that was likely swallowed prior to delivery.  Shown how to hand express colostrum for infant and mother able to express many large drops and feed to . Encouraged much STS contact, offering the breast when baby first begins to root and show hunger cues, and every 2-3 hours if baby is sleepy and without feeding cues. Encouraged much STS contact with attempts to offer the breast, educating on benefits, and hand expression of colostrum. Discussed surgical history and potential impact breast reduction may have on milk supply. Educated on how milk production works, breast changes during pregnancy, and tips to encourage and support milk production with this baby. Educated on potential benefits of pumping to increase stimulation to the breast and offered set up with hospital grade breast pump. Mother agreeable to plan of care and desires to pump for additional stimulation to the breast. Reassured mother that she may not collect much with pumping sessions and explained rationale, explaining that colostrum is nutrient rich  milk that is thick, concentrated in a small in volume and often difficult for the pump to express. Educated on types of milk mom will produces and when to expect these changes, explaining that as milk matures it becomes thinner and more copious in volume and easier for the pump to express. Educated on benefits to pump regardless of volumes expressed to stimulate milk supply. Hospital grade breast pump and pumping supply kit brought to bedside. Instructed mother on how to set up and program breast pump with premie+ setting and offered to assist with first pumping session. Set up with first pumping session. Explained benefits of relaxation and not looking at volumes collecting while pumping. Encouraged breast massage/compressions to support pumping sessions, educating on benefits, and that this will boost fat and caloric content in milk supply.  Shown how to adjust the suction of the pump as needed, ensuring to pump on the highest comfortable setting, explaining that pumping should not cause discomfort and can decrease or increase suction as needed to a comfortable level. Reviewed appropriate flange fit as well. Encouraged hand expression in addition to pumping. Encouraged mother to continue offering the breast ad raya, pumping after breast feeding q3h for 15 minutes using the premie+ setting. Educated on when prolactin levels are highest and discussed adjusting pumping sessions as needed if mother needs to rest or if infant is cluster feeding. Shown how to collect any milk that is expressed and encouraged to feed even droplets to baby. Instruction provided on how to clean pumping equipment, and store, collect, and feed EBM. Breast feeding and pumping education reviewed. Educated on how to know baby is getting enough at the breast including daily goals for infant feedings, output, and expected weight trends. Educated on inpatient and outpatient breast feeding support available and how to contact. Encouraged to utilize support as needed. Name and number provided on whiteboard. Encouraged to call for LC to assess latch with next feeding and as needed prn. Response: Verbalized understanding of teaching provided. Comfortable with plan of care at this time. Will call for f/u support prn.

## 2022-10-20 NOTE — LACTATION NOTE
This note was copied from a baby's chart. Lactation Progress Note      Data:    Initial consult on [facility] for multip with an infant born at 39.1 weeks gestation. MOB had a periareolar breast reduction 5 years ago. States she tried to breast feed her 1st infant (who is still under 2 yrs old) but her mature milk never came in. MOB desires to provide colostrum for this infant & see if mature milk comes in this time. RN calling for this consult to help with 1st breast feed after delivery. Action:    Introduced self to patient as lactation, name and phone number written on white board in room. Educated MOB about cross cradle & football positions & the importance of infant head support when latching infant. MOB desires to try cross cradle. Assisted MOB get infant into position at left breast. Infant began to root & latched right on. Saw COREY with SRS noted. After a few minutes infant began to slip down & become more shallowly latched. Educated MOB how to break suction and try again. Again, infant got COREY. Infant nursed for 60 minutes. Discuss breast reduction & how to know infant is getting enough. Reviewed with parents what to expect with infant feedings, infant output, the importance of a deep latch and how to achieve it, normal  behavior, how to wake a sleepy infant to feed, how the breasts work to make milk, protecting milk supply, what to expect with cluster feeding, and breast care. Educated mother on how to hand express colostrum. Reviewed infant feeding cues and encouraged mother to allow infant to breast feed on demand anytime feeding cues are shown and if no feeding cues are shown to attempt to wake infant to feed every 2-3 hours. If infant is still too sleepy to latch to hand express colostrum into infants mouth for about ten minutes, then try again in 2-3 hours. After the first day of life to breast feed a minimum of 8-12 times a day per 24 hour period.  Also encouraged mother to avoid giving infant a pacifier, bottle, or pump for at least the first two weeks of life or until breast feeding is well established. Encouraged good hydration, nutrition, and rest, and to keep taking prenatal vitamin while lactating. Encouraged much skin to skin between mother and infant and father and infant. Breast feeding log reviewed, all questions answered. Mother instructed to call lactation for F/U care as needed. Response:    MOB verbalized an understanding of education provided and will call for assistance as needed.

## 2022-10-20 NOTE — PLAN OF CARE
Interventional Radiology Note  06/25/21  10:05 AM      Pt was noted to be on the outpatient schedule at the [facility] surgery center for 8 PICC line placement and has now currently admitted.      Spoke with ALEX Santillan and will move ahead with cancelling this outpatient procedure.  If the patient needs an inpatient PICC line placement the team will consult the vascular access team and if that fails they will put in a consult for IR.    Karon Kim PA-C  Interventional Radiology  Pager: [phone]     Problem: Vaginal Birth or  Section  Goal: Fetal and maternal status remain reassuring during the birth process  Description:  Birth OB-Pregnancy care plan goal which identifies if the fetal and maternal status remain reassuring during the birth process  Outcome: Progressing     Problem: Postpartum  Goal: Experiences normal postpartum course  Description:  Postpartum OB-Pregnancy care plan goal which identifies if the mother is experiencing a normal postpartum course  Outcome: Progressing  Goal: Appropriate maternal -  bonding  Description:  Postpartum OB-Pregnancy care plan goal which identifies if the mother and  are bonding appropriately  Outcome: Progressing  Goal: Establishment of infant feeding pattern  Description:  Postpartum OB-Pregnancy care plan goal which identifies if the mother is establishing a feeding pattern with their   Outcome: Progressing  Goal: Incisions, wounds, or drain sites healing without S/S of infection  Outcome: Progressing     Problem: Pain  Goal: Verbalizes/displays adequate comfort level or baseline comfort level  Outcome: Progressing     Problem: Infection - Adult  Goal: Absence of infection at discharge  Outcome: Progressing  Goal: Absence of infection during hospitalization  Outcome: Progressing  Goal: Absence of fever/infection during anticipated neutropenic period  Outcome: Progressing

## 2022-10-20 NOTE — ANESTHESIA POSTPROCEDURE EVALUATION
Department of Anesthesiology  Postprocedure Note    Patient: Luli Davidson  MRN: [id number]  YOB: 1995  Date of evaluation: 10/20/2022      Procedure Summary     Date: 10/19/22 Room / Location:     Anesthesia Start: 5081 Anesthesia Stop: 2113    Procedure: Labor Analgesia Diagnosis:     Scheduled Providers:  Responsible Provider: Lorri Choudhary MD    Anesthesia Type: epidural ASA Status: 2          Anesthesia Type: No value filed. Gabe Phase I: Gabe Score: 9    Gabe Phase II: Gabe Score: 10      Anesthesia Post Evaluation    Level of consciousness: awake  Complications: no  Cardiovascular status: hemodynamically stable  Respiratory status: acceptable  Comments: No apparent complications from neuraxial anesthesia.

## 2022-10-20 NOTE — LACTATION NOTE
This note was copied from a baby's chart. Lactation Progress Note      Data:    Follow up consult for multip on day 1 pp with an infant born at 39.1 weeks gestation. MOB had a periareolar breast reduction 5 years ago but still has feeling in nipples/areola. States she tried to breast feed her 1st infant but was only able to produce colostrum & mature milk never came in. This infant has been breast feeding well & MOB is using breast pump after feeds. MOB has a small abrasion on left nipple but states that it does not hurt. Feeding Method: Breastfeeding x3 for 15-60 minutes at a time + EBM x1   Urine output: established   Stool output: established  Percent weight change from birth:  0%         Action:    Reviewed cross cradle position & steps for a deep latch. MOB able to independently get infant latched & able to break suction & get infant on more deeply when infant began to slide down. Infant breast fed in cross cradle position at left breast for 20 minutes. Reviewed breast feeding & pumping education & provided MOB with much encouragement. Educated MOB about ways to heal nipple abrasion & how a deep latch can prevent further nipple trauma. Educated MOB about SNS system for future use if needed & provided for take home use. Set MOB up with belly band for hands free pumping. Re-checked flange size & offered MOB 21 mm flanges for a better fit. Provided tips to increase possible output including warming flanges before use & covering chest while pumping, etc. All questions answered & MOB encouraged to call with next feeding attempt for support. Response:    MOB states she will call for assistance with next feed, verbalized an understanding of education provided.

## 2022-10-21 VITALS
OXYGEN SATURATION: 98 % | WEIGHT: 210 LBS | RESPIRATION RATE: 16 BRPM | HEIGHT: 68 IN | TEMPERATURE: 97.9 F | DIASTOLIC BLOOD PRESSURE: 70 MMHG | HEART RATE: 68 BPM | SYSTOLIC BLOOD PRESSURE: 119 MMHG | BODY MASS INDEX: 31.83 KG/M2

## 2022-10-21 PROCEDURE — 6370000000 HC RX 637 (ALT 250 FOR IP): Performed by: OBSTETRICS & GYNECOLOGY

## 2022-10-21 RX ORDER — FERROUS SULFATE 325(65) MG
325 TABLET ORAL 2 TIMES DAILY WITH MEALS
Qty: 60 TABLET | Refills: 1 | Status: SHIPPED | OUTPATIENT
Start: 2022-10-21

## 2022-10-21 RX ORDER — IBUPROFEN 800 MG/1
800 TABLET ORAL EVERY 8 HOURS
Qty: 60 TABLET | Refills: 0 | Status: SHIPPED | OUTPATIENT
Start: 2022-10-21

## 2022-10-21 RX ADMIN — MOXIFLOXACIN HYDROCHLORIDE 800 MG: 400 TABLET, FILM COATED ORAL at 01:45

## 2022-10-21 RX ADMIN — DOCUSATE SODIUM 100 MG: 100 CAPSULE, LIQUID FILLED ORAL at 09:53

## 2022-10-21 RX ADMIN — MOXIFLOXACIN HYDROCHLORIDE 800 MG: 400 TABLET, FILM COATED ORAL at 09:53

## 2022-10-21 RX ADMIN — ACETAMINOPHEN 1000 MG: 500 TABLET ORAL at 06:31

## 2022-10-21 ASSESSMENT — PAIN DESCRIPTION - LOCATION
LOCATION: ABDOMEN
LOCATION: ABDOMEN

## 2022-10-21 ASSESSMENT — PAIN DESCRIPTION - DESCRIPTORS
DESCRIPTORS: CRAMPING
DESCRIPTORS: CRAMPING

## 2022-10-21 ASSESSMENT — PAIN SCALES - GENERAL
PAINLEVEL_OUTOF10: 4
PAINLEVEL_OUTOF10: 3
PAINLEVEL_OUTOF10: 4

## 2022-10-21 NOTE — PLAN OF CARE
Problem: Vaginal Birth or  Section  Goal: Fetal and maternal status remain reassuring during the birth process  Description:  Birth OB-Pregnancy care plan goal which identifies if the fetal and maternal status remain reassuring during the birth process  Outcome: Adequate for Discharge     Problem: Postpartum  Goal: Experiences normal postpartum course  Description:  Postpartum OB-Pregnancy care plan goal which identifies if the mother is experiencing a normal postpartum course  10/21/2022 0827 by Akira Cuenca RN  Outcome: Adequate for Discharge  10/21/2022 0327 by Genoveva Bucio RN  Outcome: Progressing  Goal: Appropriate maternal -  bonding  Description:  Postpartum OB-Pregnancy care plan goal which identifies if the mother and  are bonding appropriately  10/21/2022 0827 by Akira Cuenca RN  Outcome: Adequate for Discharge  10/21/2022 0327 by Genoveva Bucio RN  Outcome: Progressing  Goal: Establishment of infant feeding pattern  Description:  Postpartum OB-Pregnancy care plan goal which identifies if the mother is establishing a feeding pattern with their   10/21/2022 0827 by Akira Cuenca RN  Outcome: Adequate for Discharge  10/21/2022 0327 by Genoveva Bucio RN  Outcome: Progressing  Goal: Incisions, wounds, or drain sites healing without S/S of infection  10/21/2022 0827 by Akira Cuenca RN  Outcome: Adequate for Discharge  10/21/2022 0327 by Genoveva Bucio RN  Outcome: Progressing     Problem: Pain  Goal: Verbalizes/displays adequate comfort level or baseline comfort level  10/21/2022 0827 by Akira Cuenca RN  Outcome: Adequate for Discharge  10/21/2022 0327 by Genoveva Bucio RN  Outcome: Progressing     Problem: Infection - Adult  Goal: Absence of infection at discharge  10/21/2022 0827 by Akira Cuenca RN  Outcome: Adequate for Discharge  10/21/2022 0327 by Genoveva Bucio RN  Outcome: Progressing  Goal: Absence of infection during hospitalization  10/21/2022 0827 by Reza Reyna RN  Outcome: Adequate for Discharge  10/21/2022 0327 by Gumaro Hinton RN  Outcome: Progressing  Goal: Absence of fever/infection during anticipated neutropenic period  10/21/2022 0827 by Reza Reyna RN  Outcome: Adequate for Discharge  10/21/2022 0327 by Gumaro Hinton RN  Outcome: Progressing     Problem: Safety - Adult  Goal: Free from fall injury  10/21/2022 0827 by Reza Reyna RN  Outcome: Adequate for Discharge  10/21/2022 0327 by Gumaro Hinton RN  Outcome: Progressing     Problem: Discharge Planning  Goal: Discharge to home or other facility with appropriate resources  10/21/2022 0827 by Reza Reyna RN  Outcome: Adequate for Discharge  10/21/2022 0327 by Gumaro Hinton RN  Outcome: Progressing     Problem: Chronic Conditions and Co-morbidities  Goal: Patient's chronic conditions and co-morbidity symptoms are monitored and maintained or improved  10/21/2022 0827 by Reza Reyna RN  Outcome: Adequate for Discharge  10/21/2022 0327 by Gumaro Hinton RN  Outcome: Progressing

## 2022-10-21 NOTE — LACTATION NOTE
This note was copied from a baby's chart. Lactation Progress Note      Data:      Follow up consult for multip on day 1 pp with an infant born at 39.1 weeks gestation. FELIPE had a periareolar breast reduction 5 years ago but still has feeling in nipples/areola. States she tried to breast feed her 1st infant but was only able to produce colostrum & mature milk never came in. This infant has been breast feeding well & MOB is using breast pump after feeds. FELIPE has a small abrasion on left nipple but states that it does not hurt. This consult was for support & discharge teaching. At time of consult, infant was finishing up her 24 hour testing. FELIPE desires to rent a hospital grade pump. Placed her on our waiting list & provided links to other places she may be able to rent one. Feeding Method: Breastfeeding x3 for 15-60 minutes at a time + EBM x1   Urine output: established   Stool output: established  Percent weight change from birth: -4.08%          Action:    Reviewed breast feeding & pumping education & completed discharge teaching. After 24 hour testing was completed, MOB was able to independently position infant in cross cradle at right breast & get COREY with SRS noted. Infant started to slide down & latch became more shallow. MOB able to make note of this on her own, break suction correctly, and get infant re-latched more deeply. All questions answered & community resources provided. Response:    MOB & FOB verbalized an understanding of education provided and will call for assistance as needed.

## 2022-10-21 NOTE — DISCHARGE INSTRUCTIONS
Thank you for the opportunity to care for you and your family. We hope that you are happy with the care we provided during your stay in the [facility]. We want to ensure that you have the help you need when you leave the hospital. If there is anything we can assist you with, please let us know. Breastfeeding mothers may contact our lactation specialists with any problems or questions. [facility] lactation services phone number is [phone]. Leave a message and your call will be returned. Please refer to the information provided in the \"Caring for Yourself\" tab in your discharge binder (Guidelines for [employer]). The following are warning signs to remember. Call 911 if you have:    Chest pain or pressure  Shortness of breath, even at rest  Thoughts of harming yourself or your baby  Seizures    Call your healthcare provider if you have:    Temperature of 100.4 degrees or higher  Stitches that are not healing        -- Swelling, bleeding, drainage, foul odor, redness or warmth in/around your           stitches, staples, or incision (scar)        -- Bad smelling blood or discharge from the vagina  Vaginal bleeding that has increased         -- Soaking through one pad in an hour        -- You are passing clots larger than the size of a lemon  Red, warm tender area(s) in your breast or calf  Headache that does not get better, even after taking medicine; or headache with vision changes    Remember to notify all healthcare providers from your date of delivery to up to one year after giving birth! CARING FOR YOURSELF        DIET/ACTIVITY    Eat a well balanced diet focusing on foods high in fiber and protein. Drink plenty of fluids, especially water. To avoid constipation you may take a mild stool softener as recommended by your doctor or midwife. Gradually increase your activity. Resume an exercise regime only after being advised by your doctor or midwife.   When sitting or lying down, keep your legs elevated to reduce swelling. Avoid lifting anything heavier than your baby or a gallon of milk. Avoid driving for two weeks or while taking narcotics. No sexual intercourse for 6 weeks, or until advised by your OB provider. Nothing in the vagina: intercourse, tampons or douching. Be prepared to discuss family planning at your follow up OB visit. If your feel tired and have a lack of energy, you may continue to take your prenatal vitamins. Nap when your baby naps to catch on sleep. EMOTIONS    You may feel deras, sad, teary and overwhelmed. Contact your OB provider if you think you may be showing signs of postpartum depression. Please refer to the [employer]ak 1898 tab in your discharge binder. If your baby will not stop crying, contact another adult to help or place the baby in its crib on its back and take a break. Never shake your baby! NAT CARE     Vaginal bleeding will decrease in amount over the next few weeks. Cleanse your perineum from front to back using the nat-bottle after toileting until bleeding stops. You will notice that as your activity increases, your flow may also increase. This is a sign that you need to rest more often. Call your OB provider if you are saturating more than 1 maxi pad an hour and resting does not help. If used, stiches will dissolve in 4-6 weeks. To ease pain or swelling, use prescribed medications properly or use a sitz bath, if ordered. Kegel exercises will help to restore bladder control. BREAST CARE    FOR BREASTFEEDING MOMS:    If you become engorged, feeding may be more difficult or painful in 1 to 2 days. You may find it helpful to hand express some milk so that the infant can latch on more easily. While breastfeeding continue to take your prenatal vitamins as directed. Refer to the breastfeeding information in the discharge binder.       FOR NON-BREASTFEEDING MOMS:    You may apply ice packs to your breasts over your bra for 20 minutes at a time for comfort. Do not express breast milk. Avoid stimulation to your breasts. When showering, allow the water to strike only your back. Wear a good fitting bra, such as a sports bra, until your milk dries up    [address]    Your abdomen is tender to the touch or you have pain that cannot be relieved. Flu-like symptoms such as achy muscles and joints or you are experiencing extreme weakness or dizziness. Persistent burning or increased urgency in urination. LITERATURE PROVIDED    For Moms and Those Who Care About Them  [employer] Healthy Start, Grow Smart  Breastfeeding Best for Giovana Rivas, [place] for Mom. [address] Information About [employer]  Controlling pain. I have received the educational material listed above. The  Channel has provided me with the opportunity to view instructional videos pertaining to infant care and the care of myself. I verify that I have received the above information and have no further questions and feel confident to care for myself and my baby. For more information about postpartum care, baby care and feeding, create a [facility] My Chart account, sign in and search using the magnifying glass, typing in postpartum, breast feeding or formula feeding. [email or web address]

## 2022-10-21 NOTE — PROGRESS NOTES
[facility] and Delivery   Post Partum Progress Note      SUBJECTIVE:  PPD#2 s/p  female  Doing well breastfeeding    OBJECTIVE:      Vitals:  Vitals:    10/21/22 0526   BP: 119/70   Pulse: 68   Resp: 16   Temp: 97.9 °F (36.6 °C)   SpO2:         Fundus firm, normal lochia  Extremities normal      DATA:    CBC:    Lab Results   Component Value Date/Time    WBC 11.0 10/20/2022 10:10 PM    RBC 3.88 10/20/2022 10:10 PM    HGB 9.7 10/20/2022 10:10 PM    HCT 29.3 10/20/2022 10:10 PM    MCV 75.5 10/20/2022 10:10 PM    RDW 16.2 10/20/2022 10:10 PM     10/20/2022 10:10 PM       ASSESSMENT & PLAN:      PPD#2 s/p  female  Dc home today   Scripts motrin/ferrous sulfate reviewed , fu 6 weeks for pp     Shannan Rivera MD

## 2022-11-18 ENCOUNTER — OFFICE VISIT (OUTPATIENT)
Dept: FAMILY MEDICINE CLINIC | Age: 27
End: 2022-11-18
Payer: COMMERCIAL

## 2022-11-18 VITALS
HEART RATE: 68 BPM | TEMPERATURE: 97.1 F | DIASTOLIC BLOOD PRESSURE: 65 MMHG | RESPIRATION RATE: 16 BRPM | HEIGHT: 68 IN | BODY MASS INDEX: 29.86 KG/M2 | WEIGHT: 197 LBS | SYSTOLIC BLOOD PRESSURE: 107 MMHG

## 2022-11-18 DIAGNOSIS — Z00.00 ROUTINE GENERAL MEDICAL EXAMINATION AT A HEALTH CARE FACILITY: ICD-10-CM

## 2022-11-18 DIAGNOSIS — Z00.00 ROUTINE GENERAL MEDICAL EXAMINATION AT A HEALTH CARE FACILITY: Primary | ICD-10-CM

## 2022-11-18 LAB
CHOLESTEROL, FASTING: 205 MG/DL (ref 0–199)
GLUCOSE FASTING: 80 MG/DL (ref 70–99)
HDLC SERPL-MCNC: 44 MG/DL (ref 40–60)
LDL CHOLESTEROL CALCULATED: 143 MG/DL
TRIGLYCERIDE, FASTING: 90 MG/DL (ref 0–150)
VLDLC SERPL CALC-MCNC: 18 MG/DL

## 2022-11-18 PROCEDURE — 90674 CCIIV4 VAC NO PRSV 0.5 ML IM: CPT | Performed by: NURSE PRACTITIONER

## 2022-11-18 PROCEDURE — 90471 IMMUNIZATION ADMIN: CPT | Performed by: NURSE PRACTITIONER

## 2022-11-18 PROCEDURE — 99395 PREV VISIT EST AGE 18-39: CPT | Performed by: NURSE PRACTITIONER

## 2022-11-18 ASSESSMENT — PATIENT HEALTH QUESTIONNAIRE - PHQ9
SUM OF ALL RESPONSES TO PHQ9 QUESTIONS 1 & 2: 0
2. FEELING DOWN, DEPRESSED OR HOPELESS: 0
SUM OF ALL RESPONSES TO PHQ QUESTIONS 1-9: 0
1. LITTLE INTEREST OR PLEASURE IN DOING THINGS: 0
SUM OF ALL RESPONSES TO PHQ QUESTIONS 1-9: 0
DEPRESSION UNABLE TO ASSESS: FUNCTIONAL CAPACITY MOTIVATION LIMITS ACCURACY
SUM OF ALL RESPONSES TO PHQ QUESTIONS 1-9: 0
SUM OF ALL RESPONSES TO PHQ QUESTIONS 1-9: 0

## 2022-11-18 ASSESSMENT — ANXIETY QUESTIONNAIRES
IF YOU CHECKED OFF ANY PROBLEMS ON THIS QUESTIONNAIRE, HOW DIFFICULT HAVE THESE PROBLEMS MADE IT FOR YOU TO DO YOUR WORK, TAKE CARE OF THINGS AT HOME, OR GET ALONG WITH OTHER PEOPLE: NOT DIFFICULT AT ALL
6. BECOMING EASILY ANNOYED OR IRRITABLE: 0
4. TROUBLE RELAXING: 0
3. WORRYING TOO MUCH ABOUT DIFFERENT THINGS: 0
5. BEING SO RESTLESS THAT IT IS HARD TO SIT STILL: 0
1. FEELING NERVOUS, ANXIOUS, OR ON EDGE: 0
2. NOT BEING ABLE TO STOP OR CONTROL WORRYING: 0
7. FEELING AFRAID AS IF SOMETHING AWFUL MIGHT HAPPEN: 0
GAD7 TOTAL SCORE: 0

## 2022-11-18 ASSESSMENT — ENCOUNTER SYMPTOMS
VOMITING: 0
NAUSEA: 0
COUGH: 0
DIARRHEA: 0
SHORTNESS OF BREATH: 0

## 2022-11-18 NOTE — PROGRESS NOTES
2022    Chief Complaint   Patient presents with    Annual Exam     Diane Dupree (:  1995) is a 32 y.o. female, here for a preventive medicine evaluation. Here for annual exam  Denies complaints or concerns today  She is 4 weeks postpartum  Recently back on Zoloft postpartum  Feels that she is bonding with the baby well  Not breast-feeding  Some light vaginal bleeding still, has follow-up with obstetrician in 2 weeks  Has a 16month-old at home, she is a stay-at-home mom    Needs biometric screening form completed  Patient Active Problem List   Diagnosis    Irritable bowel syndrome with both constipation and diarrhea    Blood in stool    Fatigue    GA (generalized anxiety disorder)    Diarrhea    Moderate episode of recurrent major depressive disorder (HCC)    Normal labor    39 weeks gestation of pregnancy    Vaginal delivery       Review of Systems   Constitutional:  Negative for chills, fatigue and fever. Respiratory:  Negative for cough and shortness of breath. Cardiovascular:  Negative for chest pain and leg swelling. Gastrointestinal:  Negative for diarrhea, nausea and vomiting. Neurological:  Negative for dizziness and headaches. All other systems reviewed and are negative. Prior to Visit Medications    Medication Sig Taking? Authorizing Provider   sertraline (ZOLOFT) 50 MG tablet Take 1 tablet by mouth daily Yes Hayden Ramirez MD   Prenatal Vit-Fe Fumarate-FA (PRENATAL PO) Take by mouth Yes Historical Provider, MD   ferrous sulfate (IRON 325) 325 (65 Fe) MG tablet Take 1 tablet by mouth 2 times daily (with meals)  Patient not taking: Reported on 2022  Lydia Christianson MD   ibuprofen (ADVIL;MOTRIN) 800 MG tablet Take 1 tablet by mouth in the morning and 1 tablet at noon and 1 tablet in the evening.   Patient not taking: Reported on 2022  Lydia Christianson MD        No Known Allergies    Past Medical History:   Diagnosis Date    GA (generalized anxiety disorder) 8/2/2019    Irritable bowel syndrome with both constipation and diarrhea 10/11/2018    Low plasma von Willebrand factor (vWF)     false positive    Moderate episode of recurrent major depressive disorder ([facility] [address] 3/5/2020    Rh negative status during pregnancy        Past Surgical History:   Procedure Laterality Date    BREAST REDUCTION SURGERY  2016       Social History     Socioeconomic History    Marital status:      Spouse name: Not on file    Number of children: Not on file    Years of education: Not on file    Highest education level: Not on file   Occupational History    Not on file   Tobacco Use    Smoking status: Never    Smokeless tobacco: Never   Vaping Use    Vaping Use: Never used   Substance and Sexual Activity    Alcohol use: Not Currently    Drug use: No    Sexual activity: Yes     Partners: Male   Other Topics Concern    Not on file   Social History Narrative    Not on file     Social Determinants of Health     Financial Resource Strain: Not on file   Food Insecurity: Not on file   Transportation Needs: Not on file   Physical Activity: Not on file   Stress: Not on file   Social Connections: Not on file   Intimate Partner Violence: Not on file   Housing Stability: Not on file        Family History   Problem Relation Age of Onset    High Blood Pressure Father     Cancer Brother         Lymphoma       ADVANCE DIRECTIVE: N, <no information>    Vitals:    11/18/22 1033   BP: 107/65   Site: Left Upper Arm   Position: Sitting   Pulse: 68   Resp: 16   Temp: 97.1 °F (36.2 °C)   TempSrc: Temporal   Weight: 197 lb (89.4 kg)   Height: 5' 8\" (1.727 m)     Estimated body mass index is 29.95 kg/m² as calculated from the following:    Height as of this encounter: 5' 8\" (1.727 m). Weight as of this encounter: 197 lb (89.4 kg). Physical Exam  Vitals and nursing note reviewed. Constitutional:       General: She is not in acute distress. Appearance: Normal appearance. She is well-developed. She is obese. She is not ill-appearing, toxic-appearing or diaphoretic. HENT:      Head: Normocephalic and atraumatic. Eyes:      Extraocular Movements: Extraocular movements intact. Conjunctiva/sclera: Conjunctivae normal.      Pupils: Pupils are equal, round, and reactive to light. Cardiovascular:      Rate and Rhythm: Normal rate and regular rhythm. Heart sounds: Normal heart sounds, S1 normal and S2 normal. No murmur heard. No friction rub. No gallop. Pulmonary:      Effort: Pulmonary effort is normal. No respiratory distress. Breath sounds: Normal breath sounds. No stridor. No wheezing or rales. Neurological:      General: No focal deficit present. Mental Status: She is alert and oriented to person, place, and time. Mental status is at baseline. Cranial Nerves: No cranial nerve deficit. Psychiatric:         Speech: Speech normal.       No flowsheet data found. Lab Results   Component Value Date/Time    CHOLFAST 140 10/11/2018 10:51 AM    TRIGLYCFAST 68 10/11/2018 10:51 AM    HDL 44 10/11/2018 10:51 AM    LDLCALC 82 10/11/2018 10:51 AM    GLUCOSE 89 06/06/2021 01:00 AM       The ASCVD Risk score (Mei DK, et al., 2019) failed to calculate for the following reasons:     The 2019 ASCVD risk score is only valid for ages 36 to 78    Immunization History   Administered Date(s) Administered    COVID-19, MODERNA BLUE border, Primary or Immunocompromised, (age 12y+), IM, 100 mcg/0.5mL 07/27/2021    Influenza, AFLURIA (age 1 yrs+), FLUZONE, (age 10 mo+), MDV, 0.5mL 10/11/2018    Influenza, FLUCELVAX, (age 10 mo+), MDCK, PF, 0.5mL 11/18/2022    Meningococcal MCV4P (Menactra) 07/27/2015    Tdap (Boostrix, Adacel) 05/06/2009, 10/11/2018       Health Maintenance   Topic Date Due    Varicella vaccine (1 of 2 - 2-dose childhood series) Never done    HPV vaccine (1 - 2-dose series) Never done    HIV screen  Never done    Hepatitis C screen  Never done    COVID-19 Vaccine (2 - [place]bed[place] Dar series) 08/24/2021    Pap smear  09/28/2023    Depression Monitoring  11/18/2023    DTaP/Tdap/Td vaccine (3 - Td or Tdap) 10/11/2028    Flu vaccine  Completed    Hepatitis A vaccine  Aged Out    Hib vaccine  Aged Out    Meningococcal (ACWY) vaccine  Aged Out    Pneumococcal 0-64 years Vaccine  Aged Out       Assessment & Plan   Routine general medical examination at a health care facility  -     Glucose, Fasting; Future  -     Lipid, Fasting; Future    Return in about 1 year (around 11/18/2023).          --Bran Freeman, APRN - CNP

## 2022-12-08 ENCOUNTER — TELEPHONE (OUTPATIENT)
Dept: PRIMARY CARE CLINIC | Age: 27
End: 2022-12-08

## 2022-12-08 NOTE — TELEPHONE ENCOUNTER
Patient didn't connect for her VV. I called and there was no answer and it went to automated VM so no message was left. If patient would like to be seen by the virtualist, will need to reschedule VV for another day as there are no openings today.

## 2023-07-20 ENCOUNTER — OFFICE VISIT (OUTPATIENT)
Dept: FAMILY MEDICINE CLINIC | Age: 28
End: 2023-07-20
Payer: COMMERCIAL

## 2023-07-20 VITALS
BODY MASS INDEX: 30.26 KG/M2 | RESPIRATION RATE: 16 BRPM | TEMPERATURE: 97.1 F | DIASTOLIC BLOOD PRESSURE: 76 MMHG | SYSTOLIC BLOOD PRESSURE: 107 MMHG | HEART RATE: 65 BPM | WEIGHT: 199 LBS | OXYGEN SATURATION: 99 %

## 2023-07-20 DIAGNOSIS — R63.2 BINGE EATING: ICD-10-CM

## 2023-07-20 DIAGNOSIS — R53.83 FATIGUE, UNSPECIFIED TYPE: ICD-10-CM

## 2023-07-20 DIAGNOSIS — Z13.1 DIABETES MELLITUS SCREENING: ICD-10-CM

## 2023-07-20 DIAGNOSIS — R63.2 BINGE EATING: Primary | ICD-10-CM

## 2023-07-20 DIAGNOSIS — Z13.220 SCREENING CHOLESTEROL LEVEL: ICD-10-CM

## 2023-07-20 DIAGNOSIS — E66.09 CLASS 1 OBESITY DUE TO EXCESS CALORIES WITHOUT SERIOUS COMORBIDITY WITH BODY MASS INDEX (BMI) OF 30.0 TO 30.9 IN ADULT: ICD-10-CM

## 2023-07-20 LAB
25(OH)D3 SERPL-MCNC: 28.9 NG/ML
DEPRECATED RDW RBC AUTO: 14.7 % (ref 12.4–15.4)
HCT VFR BLD AUTO: 40.2 % (ref 36–48)
HGB BLD-MCNC: 13.4 G/DL (ref 12–16)
MCH RBC QN AUTO: 28.4 PG (ref 26–34)
MCHC RBC AUTO-ENTMCNC: 33.4 G/DL (ref 31–36)
MCV RBC AUTO: 85 FL (ref 80–100)
PLATELET # BLD AUTO: 265 K/UL (ref 135–450)
PMV BLD AUTO: 8.5 FL (ref 5–10.5)
RBC # BLD AUTO: 4.73 M/UL (ref 4–5.2)
WBC # BLD AUTO: 6.2 K/UL (ref 4–11)

## 2023-07-20 PROCEDURE — 99213 OFFICE O/P EST LOW 20 MIN: CPT | Performed by: NURSE PRACTITIONER

## 2023-07-20 RX ORDER — NORETHINDRONE ACETATE AND ETHINYL ESTRADIOL 1MG-20(21)
1 KIT ORAL DAILY
COMMUNITY
Start: 2023-07-13

## 2023-07-20 SDOH — ECONOMIC STABILITY: INCOME INSECURITY: HOW HARD IS IT FOR YOU TO PAY FOR THE VERY BASICS LIKE FOOD, HOUSING, MEDICAL CARE, AND HEATING?: NOT HARD AT ALL

## 2023-07-20 SDOH — ECONOMIC STABILITY: HOUSING INSECURITY
IN THE LAST 12 MONTHS, WAS THERE A TIME WHEN YOU DID NOT HAVE A STEADY PLACE TO SLEEP OR SLEPT IN A SHELTER (INCLUDING NOW)?: NO

## 2023-07-20 SDOH — ECONOMIC STABILITY: FOOD INSECURITY: WITHIN THE PAST 12 MONTHS, YOU WORRIED THAT YOUR FOOD WOULD RUN OUT BEFORE YOU GOT MONEY TO BUY MORE.: NEVER TRUE

## 2023-07-20 SDOH — ECONOMIC STABILITY: FOOD INSECURITY: WITHIN THE PAST 12 MONTHS, THE FOOD YOU BOUGHT JUST DIDN'T LAST AND YOU DIDN'T HAVE MONEY TO GET MORE.: NEVER TRUE

## 2023-07-20 ASSESSMENT — ANXIETY QUESTIONNAIRES
3. WORRYING TOO MUCH ABOUT DIFFERENT THINGS: 0
7. FEELING AFRAID AS IF SOMETHING AWFUL MIGHT HAPPEN: 0
GAD7 TOTAL SCORE: 0
4. TROUBLE RELAXING: 0
5. BEING SO RESTLESS THAT IT IS HARD TO SIT STILL: 0
2. NOT BEING ABLE TO STOP OR CONTROL WORRYING: 0
6. BECOMING EASILY ANNOYED OR IRRITABLE: 0
1. FEELING NERVOUS, ANXIOUS, OR ON EDGE: 0
IF YOU CHECKED OFF ANY PROBLEMS ON THIS QUESTIONNAIRE, HOW DIFFICULT HAVE THESE PROBLEMS MADE IT FOR YOU TO DO YOUR WORK, TAKE CARE OF THINGS AT HOME, OR GET ALONG WITH OTHER PEOPLE: NOT DIFFICULT AT ALL

## 2023-07-20 ASSESSMENT — PATIENT HEALTH QUESTIONNAIRE - PHQ9
SUM OF ALL RESPONSES TO PHQ QUESTIONS 1-9: 0
1. LITTLE INTEREST OR PLEASURE IN DOING THINGS: 0
3. TROUBLE FALLING OR STAYING ASLEEP: 0
9. THOUGHTS THAT YOU WOULD BE BETTER OFF DEAD, OR OF HURTING YOURSELF: 0
SUM OF ALL RESPONSES TO PHQ QUESTIONS 1-9: 0
6. FEELING BAD ABOUT YOURSELF - OR THAT YOU ARE A FAILURE OR HAVE LET YOURSELF OR YOUR FAMILY DOWN: 0
4. FEELING TIRED OR HAVING LITTLE ENERGY: 0
10. IF YOU CHECKED OFF ANY PROBLEMS, HOW DIFFICULT HAVE THESE PROBLEMS MADE IT FOR YOU TO DO YOUR WORK, TAKE CARE OF THINGS AT HOME, OR GET ALONG WITH OTHER PEOPLE: 0
8. MOVING OR SPEAKING SO SLOWLY THAT OTHER PEOPLE COULD HAVE NOTICED. OR THE OPPOSITE, BEING SO FIGETY OR RESTLESS THAT YOU HAVE BEEN MOVING AROUND A LOT MORE THAN USUAL: 0
2. FEELING DOWN, DEPRESSED OR HOPELESS: 0
SUM OF ALL RESPONSES TO PHQ QUESTIONS 1-9: 0
7. TROUBLE CONCENTRATING ON THINGS, SUCH AS READING THE NEWSPAPER OR WATCHING TELEVISION: 0
SUM OF ALL RESPONSES TO PHQ9 QUESTIONS 1 & 2: 0
DEPRESSION UNABLE TO ASSESS: FUNCTIONAL CAPACITY MOTIVATION LIMITS ACCURACY
SUM OF ALL RESPONSES TO PHQ QUESTIONS 1-9: 0
5. POOR APPETITE OR OVEREATING: 0

## 2023-07-20 ASSESSMENT — ENCOUNTER SYMPTOMS
VOMITING: 0
DIARRHEA: 0
SHORTNESS OF BREATH: 0
COUGH: 0
NAUSEA: 0

## 2023-07-21 LAB
ALBUMIN SERPL-MCNC: 4.5 G/DL (ref 3.4–5)
ALBUMIN/GLOB SERPL: 1.9 {RATIO} (ref 1.1–2.2)
ALP SERPL-CCNC: 78 U/L (ref 40–129)
ALT SERPL-CCNC: 11 U/L (ref 10–40)
ANION GAP SERPL CALCULATED.3IONS-SCNC: 11 MMOL/L (ref 3–16)
AST SERPL-CCNC: 14 U/L (ref 15–37)
BILIRUB SERPL-MCNC: <0.2 MG/DL (ref 0–1)
BUN SERPL-MCNC: 10 MG/DL (ref 7–20)
CALCIUM SERPL-MCNC: 9.2 MG/DL (ref 8.3–10.6)
CHLORIDE SERPL-SCNC: 104 MMOL/L (ref 99–110)
CHOLEST SERPL-MCNC: 190 MG/DL (ref 0–199)
CO2 SERPL-SCNC: 25 MMOL/L (ref 21–32)
CREAT SERPL-MCNC: 0.7 MG/DL (ref 0.6–1.1)
EST. AVERAGE GLUCOSE BLD GHB EST-MCNC: 93.9 MG/DL
GFR SERPLBLD CREATININE-BSD FMLA CKD-EPI: >60 ML/MIN/{1.73_M2}
GLUCOSE SERPL-MCNC: 82 MG/DL (ref 70–99)
HBA1C MFR BLD: 4.9 %
HDLC SERPL-MCNC: 39 MG/DL (ref 40–60)
LDLC SERPL CALC-MCNC: 121 MG/DL
POTASSIUM SERPL-SCNC: 4.2 MMOL/L (ref 3.5–5.1)
PROT SERPL-MCNC: 6.9 G/DL (ref 6.4–8.2)
SODIUM SERPL-SCNC: 140 MMOL/L (ref 136–145)
TRIGL SERPL-MCNC: 148 MG/DL (ref 0–150)
TSH SERPL DL<=0.005 MIU/L-ACNC: 1.93 UIU/ML (ref 0.27–4.2)
VLDLC SERPL CALC-MCNC: 30 MG/DL

## 2023-08-28 ENCOUNTER — OFFICE VISIT (OUTPATIENT)
Dept: FAMILY MEDICINE CLINIC | Age: 28
End: 2023-08-28

## 2023-08-28 VITALS
WEIGHT: 206 LBS | HEART RATE: 91 BPM | OXYGEN SATURATION: 98 % | RESPIRATION RATE: 16 BRPM | BODY MASS INDEX: 31.22 KG/M2 | HEIGHT: 68 IN | TEMPERATURE: 97.1 F | DIASTOLIC BLOOD PRESSURE: 78 MMHG | SYSTOLIC BLOOD PRESSURE: 113 MMHG

## 2023-08-28 DIAGNOSIS — R61 NIGHT SWEATS: ICD-10-CM

## 2023-08-28 DIAGNOSIS — R53.83 OTHER FATIGUE: Primary | ICD-10-CM

## 2023-08-28 DIAGNOSIS — R53.83 OTHER FATIGUE: ICD-10-CM

## 2023-08-28 DIAGNOSIS — R10.11 RUQ PAIN: ICD-10-CM

## 2023-08-28 DIAGNOSIS — F41.8 MIXED ANXIETY AND DEPRESSIVE DISORDER: ICD-10-CM

## 2023-08-28 LAB
Lab: NORMAL
PERFORMING INSTRUMENT: NORMAL
QC PASS/FAIL: NORMAL
SARS-COV-2, POC: NORMAL

## 2023-08-28 RX ORDER — MELATONIN
1000 DAILY
Qty: 90 TABLET | Refills: 1 | Status: SHIPPED | OUTPATIENT
Start: 2023-08-28

## 2023-08-28 SDOH — ECONOMIC STABILITY: FOOD INSECURITY: WITHIN THE PAST 12 MONTHS, YOU WORRIED THAT YOUR FOOD WOULD RUN OUT BEFORE YOU GOT MONEY TO BUY MORE.: NEVER TRUE

## 2023-08-28 SDOH — ECONOMIC STABILITY: FOOD INSECURITY: WITHIN THE PAST 12 MONTHS, THE FOOD YOU BOUGHT JUST DIDN'T LAST AND YOU DIDN'T HAVE MONEY TO GET MORE.: NEVER TRUE

## 2023-08-28 SDOH — ECONOMIC STABILITY: INCOME INSECURITY: HOW HARD IS IT FOR YOU TO PAY FOR THE VERY BASICS LIKE FOOD, HOUSING, MEDICAL CARE, AND HEATING?: NOT HARD AT ALL

## 2023-08-28 ASSESSMENT — PATIENT HEALTH QUESTIONNAIRE - PHQ9
SUM OF ALL RESPONSES TO PHQ QUESTIONS 1-9: 9
4. FEELING TIRED OR HAVING LITTLE ENERGY: 3
SUM OF ALL RESPONSES TO PHQ QUESTIONS 1-9: 9
8. MOVING OR SPEAKING SO SLOWLY THAT OTHER PEOPLE COULD HAVE NOTICED. OR THE OPPOSITE, BEING SO FIGETY OR RESTLESS THAT YOU HAVE BEEN MOVING AROUND A LOT MORE THAN USUAL: 0
SUM OF ALL RESPONSES TO PHQ QUESTIONS 1-9: 9
7. TROUBLE CONCENTRATING ON THINGS, SUCH AS READING THE NEWSPAPER OR WATCHING TELEVISION: 0
5. POOR APPETITE OR OVEREATING: 3
9. THOUGHTS THAT YOU WOULD BE BETTER OFF DEAD, OR OF HURTING YOURSELF: 0
SUM OF ALL RESPONSES TO PHQ9 QUESTIONS 1 & 2: 0
10. IF YOU CHECKED OFF ANY PROBLEMS, HOW DIFFICULT HAVE THESE PROBLEMS MADE IT FOR YOU TO DO YOUR WORK, TAKE CARE OF THINGS AT HOME, OR GET ALONG WITH OTHER PEOPLE: 1
6. FEELING BAD ABOUT YOURSELF - OR THAT YOU ARE A FAILURE OR HAVE LET YOURSELF OR YOUR FAMILY DOWN: 0
SUM OF ALL RESPONSES TO PHQ QUESTIONS 1-9: 9
1. LITTLE INTEREST OR PLEASURE IN DOING THINGS: 0
3. TROUBLE FALLING OR STAYING ASLEEP: 3
2. FEELING DOWN, DEPRESSED OR HOPELESS: 0

## 2023-08-28 ASSESSMENT — ENCOUNTER SYMPTOMS
ABDOMINAL PAIN: 1
COUGH: 0
VOMITING: 1
NAUSEA: 0
SORE THROAT: 0

## 2023-08-28 NOTE — PROGRESS NOTES
etiologies. Advised fatigue is often associated with underlying chronic medical or psychological conditions, medications, or substance use. Most recent CBC, CMP, TSH, vitamin B12 and vitamin D are reviewed and/or ordered if appropriate. Discussed importance of sleep hygiene, balanced diet, and routine physical activity. COVID-19 -NEGATIVE   MONONUCLEOSIS SCREEN - Negative   - Discussed consider increasing Sertraline 75 mg daily or changing birth control    - Labs: CBC,ESR, VitB12  - Start Vit D supplement   -     POCT COVID-19, Antigen  -     CBC with Auto Differential; Future  -     vitamin D3 (CHOLECALCIFEROL) 25 MCG (1000 UT) TABS tablet; Take 1 tablet by mouth daily, Disp-90 tablet, R-1Normal  -     Sedimentation Rate; Future  -     Vitamin B12; Future  -     MONONUCLEOSIS SCREEN  Mixed anxiety and depressive disorder  Discussed, mood stable on Sertraline 50 mg daily. RUQ pain  Tender RUQ abdomen on exam, no urinary symptoms   Most recent CMP 7/20/23 WNL   - Check US of RUQ   -     US GALLBLADDER RUQ; Future    Night sweats  New onset. Recent trip to [place]. Started OCP 4 months ago   - Check CBC, ESR  - PPD rule out tuberculosis   - May be related to OCPs/ birth control medication     -     tuberculin injection 5 Units; 5 Units, IntraDERmal, ONCE (PPD USE), 1 dose, On Mon 8/28/23 at 1145        Patient Instructions   - Labs ESR (Sedmentation rate) , CBC, Vitamin B12   - Start Vitamin D supplement 100 units daily   -Follow up 2-3 days for ppd reading   - Ultrasound abdomen   - - Consider increasing Sertraline 75 mg daily or changing birth control       An electronic signature was used to authenticate this note.   --Coleen Chandler MD on 8/28/2023

## 2023-08-28 NOTE — PATIENT INSTRUCTIONS
- Labs ESR (Sedmentation rate) , CBC, Vitamin B12   - Start Vitamin D supplement 100 units daily   -Follow up 2-3 days for ppd reading   - Ultrasound abdomen

## 2023-08-29 DIAGNOSIS — R79.89 ABNORMAL CBC: Primary | ICD-10-CM

## 2023-08-29 DIAGNOSIS — D72.89 LEFT SHIFT WITHOUT DIAGNOSIS OF SPECIFIC INFECTION: Primary | ICD-10-CM

## 2023-08-29 LAB
BASOPHILS # BLD: 0.1 K/UL (ref 0–0.2)
BASOPHILS NFR BLD: 1 %
DEPRECATED RDW RBC AUTO: 15.3 % (ref 12.4–15.4)
EOSINOPHIL # BLD: 0.1 K/UL (ref 0–0.6)
EOSINOPHIL NFR BLD: 2 %
ERYTHROCYTE [SEDIMENTATION RATE] IN BLOOD BY WESTERGREN METHOD: 6 MM/HR (ref 0–20)
HCT VFR BLD AUTO: 37 % (ref 36–48)
HGB BLD-MCNC: 12.3 G/DL (ref 12–16)
LYMPHOCYTES # BLD: 2.3 K/UL (ref 1–5.1)
LYMPHOCYTES NFR BLD: 45 %
MCH RBC QN AUTO: 28 PG (ref 26–34)
MCHC RBC AUTO-ENTMCNC: 33.2 G/DL (ref 31–36)
MCV RBC AUTO: 84.4 FL (ref 80–100)
MONOCYTES # BLD: 0.4 K/UL (ref 0–1.3)
MONOCYTES NFR BLD: 7 %
MONONUC CELLS NFR BLD MANUAL: 4 %
NEUTROPHILS # BLD: 2.1 K/UL (ref 1.7–7.7)
NEUTROPHILS NFR BLD: 41 %
PATH INTERP BLD-IMP: NORMAL
PATH INTERP BLD-IMP: YES
PLATELET # BLD AUTO: 174 K/UL (ref 135–450)
PLATELET BLD QL SMEAR: ADEQUATE
PMV BLD AUTO: 9 FL (ref 5–10.5)
RBC # BLD AUTO: 4.38 M/UL (ref 4–5.2)
RBC MORPH BLD: NORMAL
SLIDE REVIEW: ABNORMAL
VIT B12 SERPL-MCNC: 318 PG/ML (ref 211–911)
WBC # BLD AUTO: 5.2 K/UL (ref 4–11)

## 2023-08-30 ENCOUNTER — NURSE ONLY (OUTPATIENT)
Dept: FAMILY MEDICINE CLINIC | Age: 28
End: 2023-08-30

## 2023-09-01 ENCOUNTER — HOSPITAL ENCOUNTER (OUTPATIENT)
Dept: ULTRASOUND IMAGING | Age: 28
Discharge: HOME | End: 2023-09-01
Attending: STUDENT IN AN ORGANIZED HEALTH CARE EDUCATION/TRAINING PROGRAM
Payer: COMMERCIAL

## 2023-09-01 DIAGNOSIS — D72.89 LEFT SHIFT: ICD-10-CM

## 2023-09-01 PROCEDURE — 76705 ECHO EXAM OF ABDOMEN: CPT

## 2023-09-05 ENCOUNTER — PATIENT MESSAGE (OUTPATIENT)
Dept: FAMILY MEDICINE CLINIC | Age: 28
End: 2023-09-05

## 2023-09-05 DIAGNOSIS — R16.1 SPLENOMEGALY: ICD-10-CM

## 2023-09-05 DIAGNOSIS — R16.1 SPLENOMEGALY: Primary | ICD-10-CM

## 2023-09-05 NOTE — TELEPHONE ENCOUNTER
From: William Newby  To: Dr. Boris Martinez: 9/5/2023 7:32 AM EDT  Subject: CMV + EBV    Do I need to come in and have my blood redrawn for these tests?

## 2023-09-07 LAB
CMV IGG SERPL IA-ACNC: 3.3 U/ML
CMV IGM SERPL IA-ACNC: >240 AU/ML
EBV EA-D IGG SER-ACNC: <5 U/ML (ref 0–10.9)
EBV NA IGG SER IA-ACNC: >600 U/ML (ref 0–21.9)
EBV VCA IGG SER IA-ACNC: 40.8 U/ML (ref 0–21.9)
EBV VCA IGM SER IA-ACNC: >160 U/ML (ref 0–43.9)

## 2024-01-29 ENCOUNTER — OFFICE VISIT (OUTPATIENT)
Dept: FAMILY MEDICINE CLINIC | Age: 29
End: 2024-01-29
Payer: COMMERCIAL

## 2024-01-29 VITALS
WEIGHT: 205.4 LBS | TEMPERATURE: 98.1 F | BODY MASS INDEX: 31.23 KG/M2 | SYSTOLIC BLOOD PRESSURE: 120 MMHG | DIASTOLIC BLOOD PRESSURE: 81 MMHG | OXYGEN SATURATION: 95 % | HEART RATE: 91 BPM

## 2024-01-29 DIAGNOSIS — J10.1 INFLUENZA A: Primary | ICD-10-CM

## 2024-01-29 LAB
INFLUENZA A ANTIBODY: NORMAL
INFLUENZA B ANTIBODY: NORMAL
S PYO AG THROAT QL: NORMAL

## 2024-01-29 PROCEDURE — 87880 STREP A ASSAY W/OPTIC: CPT | Performed by: SURGERY

## 2024-01-29 PROCEDURE — 87804 INFLUENZA ASSAY W/OPTIC: CPT | Performed by: SURGERY

## 2024-01-29 PROCEDURE — 99213 OFFICE O/P EST LOW 20 MIN: CPT | Performed by: SURGERY

## 2024-01-29 RX ORDER — OSELTAMIVIR PHOSPHATE 75 MG/1
75 CAPSULE ORAL 2 TIMES DAILY
Qty: 10 CAPSULE | Refills: 0 | Status: SHIPPED | OUTPATIENT
Start: 2024-01-29 | End: 2024-02-03

## 2024-01-29 ASSESSMENT — PATIENT HEALTH QUESTIONNAIRE - PHQ9
SUM OF ALL RESPONSES TO PHQ QUESTIONS 1-9: 1
SUM OF ALL RESPONSES TO PHQ QUESTIONS 1-9: 1
8. MOVING OR SPEAKING SO SLOWLY THAT OTHER PEOPLE COULD HAVE NOTICED. OR THE OPPOSITE, BEING SO FIGETY OR RESTLESS THAT YOU HAVE BEEN MOVING AROUND A LOT MORE THAN USUAL: 0
5. POOR APPETITE OR OVEREATING: 0
1. LITTLE INTEREST OR PLEASURE IN DOING THINGS: 0
10. IF YOU CHECKED OFF ANY PROBLEMS, HOW DIFFICULT HAVE THESE PROBLEMS MADE IT FOR YOU TO DO YOUR WORK, TAKE CARE OF THINGS AT HOME, OR GET ALONG WITH OTHER PEOPLE: 0
9. THOUGHTS THAT YOU WOULD BE BETTER OFF DEAD, OR OF HURTING YOURSELF: 0
7. TROUBLE CONCENTRATING ON THINGS, SUCH AS READING THE NEWSPAPER OR WATCHING TELEVISION: 0
2. FEELING DOWN, DEPRESSED OR HOPELESS: 0
3. TROUBLE FALLING OR STAYING ASLEEP: 0
6. FEELING BAD ABOUT YOURSELF - OR THAT YOU ARE A FAILURE OR HAVE LET YOURSELF OR YOUR FAMILY DOWN: 0
SUM OF ALL RESPONSES TO PHQ QUESTIONS 1-9: 1
SUM OF ALL RESPONSES TO PHQ9 QUESTIONS 1 & 2: 0
4. FEELING TIRED OR HAVING LITTLE ENERGY: 1
SUM OF ALL RESPONSES TO PHQ QUESTIONS 1-9: 1

## 2024-01-29 ASSESSMENT — ENCOUNTER SYMPTOMS
DIARRHEA: 0
CONSTIPATION: 0
SORE THROAT: 1
NAUSEA: 0
COUGH: 1
SHORTNESS OF BREATH: 0
ABDOMINAL PAIN: 0

## 2024-01-29 NOTE — PROGRESS NOTES
numbness and headaches.   Psychiatric/Behavioral:  Negative for sleep disturbance.        /81 (Site: Left Upper Arm, Position: Sitting, Cuff Size: Medium Adult)   Pulse 91   Temp 98.1 °F (36.7 °C)   Wt 93.2 kg (205 lb 6.4 oz)   LMP 01/22/2024   SpO2 95%   BMI 31.23 kg/m²     Physical Exam  Constitutional:       Appearance: Normal appearance.   HENT:      Head:      Comments: Positive bilateral cervical lymphadenopathy     Nose: Congestion present. No rhinorrhea.      Mouth/Throat:      Mouth: Mucous membranes are moist.   Eyes:      General: No scleral icterus.     Extraocular Movements: Extraocular movements intact.   Cardiovascular:      Rate and Rhythm: Normal rate and regular rhythm.      Pulses: Normal pulses.      Heart sounds: No murmur heard.  Pulmonary:      Effort: Pulmonary effort is normal. No respiratory distress.   Musculoskeletal:         General: Normal range of motion.      Right lower leg: No edema.      Left lower leg: No edema.   Skin:     General: Skin is warm and dry.      Capillary Refill: Capillary refill takes less than 2 seconds.      Findings: No rash.   Neurological:      Mental Status: She is alert and oriented to person, place, and time.   Psychiatric:         Mood and Affect: Mood normal.         Diagnosis    ICD-10-CM    1. Influenza A  J10.1 oseltamivir (TAMIFLU) 75 MG capsule           Plan    Influenza  -Supportive care with copious hydration, APAP/ibuprofen as needed aches and fevers, warm salt water gargle for sore throat  -Her  has been seen here by Dr. Farr if he develops symptoms we can call him in Tamiflu as well    No orders of the defined types were placed in this encounter.      Orders Placed This Encounter   Medications    oseltamivir (TAMIFLU) 75 MG capsule     Sig: Take 1 capsule by mouth 2 times daily for 5 days     Dispense:  10 capsule     Refill:  0       Return if symptoms worsen or fail to improve.    Caitlin Garcia, DO

## 2024-03-07 ENCOUNTER — OFFICE VISIT (OUTPATIENT)
Dept: FAMILY MEDICINE CLINIC | Age: 29
End: 2024-03-07
Payer: COMMERCIAL

## 2024-03-07 VITALS
HEIGHT: 67 IN | BODY MASS INDEX: 32.49 KG/M2 | OXYGEN SATURATION: 99 % | WEIGHT: 207 LBS | SYSTOLIC BLOOD PRESSURE: 118 MMHG | RESPIRATION RATE: 16 BRPM | DIASTOLIC BLOOD PRESSURE: 74 MMHG | TEMPERATURE: 97.1 F | HEART RATE: 82 BPM

## 2024-03-07 DIAGNOSIS — Z23 FLU VACCINE NEED: ICD-10-CM

## 2024-03-07 DIAGNOSIS — Z13.1 DIABETES MELLITUS SCREENING: ICD-10-CM

## 2024-03-07 DIAGNOSIS — F33.1 MODERATE EPISODE OF RECURRENT MAJOR DEPRESSIVE DISORDER (HCC): ICD-10-CM

## 2024-03-07 DIAGNOSIS — R22.1 NECK SWELLING: ICD-10-CM

## 2024-03-07 DIAGNOSIS — Z00.00 ANNUAL PHYSICAL EXAM: Primary | ICD-10-CM

## 2024-03-07 DIAGNOSIS — F41.1 GAD (GENERALIZED ANXIETY DISORDER): ICD-10-CM

## 2024-03-07 DIAGNOSIS — Z13.220 SCREENING FOR LIPID DISORDERS: ICD-10-CM

## 2024-03-07 PROCEDURE — 90471 IMMUNIZATION ADMIN: CPT | Performed by: STUDENT IN AN ORGANIZED HEALTH CARE EDUCATION/TRAINING PROGRAM

## 2024-03-07 PROCEDURE — 99395 PREV VISIT EST AGE 18-39: CPT | Performed by: STUDENT IN AN ORGANIZED HEALTH CARE EDUCATION/TRAINING PROGRAM

## 2024-03-07 PROCEDURE — 90674 CCIIV4 VAC NO PRSV 0.5 ML IM: CPT | Performed by: STUDENT IN AN ORGANIZED HEALTH CARE EDUCATION/TRAINING PROGRAM

## 2024-03-07 SDOH — ECONOMIC STABILITY: INCOME INSECURITY: HOW HARD IS IT FOR YOU TO PAY FOR THE VERY BASICS LIKE FOOD, HOUSING, MEDICAL CARE, AND HEATING?: NOT HARD AT ALL

## 2024-03-07 SDOH — ECONOMIC STABILITY: FOOD INSECURITY: WITHIN THE PAST 12 MONTHS, THE FOOD YOU BOUGHT JUST DIDN'T LAST AND YOU DIDN'T HAVE MONEY TO GET MORE.: NEVER TRUE

## 2024-03-07 SDOH — ECONOMIC STABILITY: FOOD INSECURITY: WITHIN THE PAST 12 MONTHS, YOU WORRIED THAT YOUR FOOD WOULD RUN OUT BEFORE YOU GOT MONEY TO BUY MORE.: NEVER TRUE

## 2024-03-07 ASSESSMENT — PATIENT HEALTH QUESTIONNAIRE - PHQ9
7. TROUBLE CONCENTRATING ON THINGS, SUCH AS READING THE NEWSPAPER OR WATCHING TELEVISION: 0
1. LITTLE INTEREST OR PLEASURE IN DOING THINGS: 0
6. FEELING BAD ABOUT YOURSELF - OR THAT YOU ARE A FAILURE OR HAVE LET YOURSELF OR YOUR FAMILY DOWN: 0
2. FEELING DOWN, DEPRESSED OR HOPELESS: 0
SUM OF ALL RESPONSES TO PHQ9 QUESTIONS 1 & 2: 0
9. THOUGHTS THAT YOU WOULD BE BETTER OFF DEAD, OR OF HURTING YOURSELF: 0
10. IF YOU CHECKED OFF ANY PROBLEMS, HOW DIFFICULT HAVE THESE PROBLEMS MADE IT FOR YOU TO DO YOUR WORK, TAKE CARE OF THINGS AT HOME, OR GET ALONG WITH OTHER PEOPLE: 0
4. FEELING TIRED OR HAVING LITTLE ENERGY: 0
SUM OF ALL RESPONSES TO PHQ QUESTIONS 1-9: 0
5. POOR APPETITE OR OVEREATING: 0
3. TROUBLE FALLING OR STAYING ASLEEP: 0
SUM OF ALL RESPONSES TO PHQ QUESTIONS 1-9: 0
8. MOVING OR SPEAKING SO SLOWLY THAT OTHER PEOPLE COULD HAVE NOTICED. OR THE OPPOSITE, BEING SO FIGETY OR RESTLESS THAT YOU HAVE BEEN MOVING AROUND A LOT MORE THAN USUAL: 0

## 2024-03-07 NOTE — PATIENT INSTRUCTIONS
. Annual physical exam  -Follow in July for labs   -Recommend 150 minutes of cardiovascular activity a week, or 10,000 to 15,000 steps a day, 2 days of weightbearing  -Encourage healthy diet,avoid processed/refined carbohydrates   - Send message about   Health Maintenance Due   Topic Date Due    Hepatitis B vaccine (1 of 3 - 3-dose series) Never done    Varicella vaccine (1 of 2 - 2-dose childhood series) Never done    Hepatitis C screen  Never done    Pap smear  Never done    Flu vaccine (1) 08/01/2023    COVID-19 Vaccine (2 - 2023-24 season) 09/01/2023

## 2024-03-07 NOTE — PROGRESS NOTES
Topics Concern    Not on file   Social History Narrative    Not on file     Social Determinants of Health     Financial Resource Strain: Low Risk  (3/7/2024)    Overall Financial Resource Strain (CARDIA)     Difficulty of Paying Living Expenses: Not hard at all   Food Insecurity: No Food Insecurity (3/7/2024)    Hunger Vital Sign     Worried About Running Out of Food in the Last Year: Never true     Ran Out of Food in the Last Year: Never true   Transportation Needs: Unknown (3/7/2024)    PRAPARE - Transportation     Lack of Transportation (Medical): Not on file     Lack of Transportation (Non-Medical): No   Physical Activity: Not on file   Stress: Not on file   Social Connections: Not on file   Intimate Partner Violence: Not on file   Housing Stability: Unknown (3/7/2024)    Housing Stability Vital Sign     Unable to Pay for Housing in the Last Year: Not on file     Number of Places Lived in the Last Year: Not on file     Unstable Housing in the Last Year: No       Reviewof Systems:  Review of Systems   Constitutional:  Positive for fatigue. Negative for diaphoresis, fever and unexpected weight change.   HENT:  Negative for ear pain, hearing loss, sore throat and trouble swallowing.    Eyes:  Negative for visual disturbance.   Respiratory:  Negative for cough, chest tightness, shortness of breath and wheezing.    Cardiovascular:  Negative for chest pain, palpitations and leg swelling.   Gastrointestinal:  Negative for abdominal pain, blood in stool, constipation, diarrhea, nausea and vomiting.   Genitourinary:  Negative for dysuria, vaginal bleeding and vaginal discharge.   Musculoskeletal:  Negative for arthralgias and joint swelling.   Skin:  Negative for rash.   Neurological:  Negative for dizziness, weakness, light-headedness, numbness and headaches.   Hematological:  Negative for adenopathy. Does not bruise/bleed easily.   Psychiatric/Behavioral:  Negative for decreased concentration, dysphoric mood,

## 2024-07-10 ENCOUNTER — PATIENT MESSAGE (OUTPATIENT)
Dept: FAMILY MEDICINE CLINIC | Age: 29
End: 2024-07-10

## 2024-07-11 ENCOUNTER — TELEMEDICINE (OUTPATIENT)
Dept: FAMILY MEDICINE CLINIC | Age: 29
End: 2024-07-11
Payer: COMMERCIAL

## 2024-07-11 DIAGNOSIS — H10.32 ACUTE BACTERIAL CONJUNCTIVITIS OF LEFT EYE: Primary | ICD-10-CM

## 2024-07-11 PROCEDURE — 99213 OFFICE O/P EST LOW 20 MIN: CPT | Performed by: NURSE PRACTITIONER

## 2024-07-11 RX ORDER — POLYMYXIN B SULFATE AND TRIMETHOPRIM 1; 10000 MG/ML; [USP'U]/ML
1 SOLUTION OPHTHALMIC EVERY 6 HOURS
Qty: 10 ML | Refills: 0 | Status: SHIPPED | OUTPATIENT
Start: 2024-07-11

## 2024-07-11 SDOH — ECONOMIC STABILITY: FOOD INSECURITY: WITHIN THE PAST 12 MONTHS, THE FOOD YOU BOUGHT JUST DIDN'T LAST AND YOU DIDN'T HAVE MONEY TO GET MORE.: NEVER TRUE

## 2024-07-11 SDOH — ECONOMIC STABILITY: FOOD INSECURITY: WITHIN THE PAST 12 MONTHS, YOU WORRIED THAT YOUR FOOD WOULD RUN OUT BEFORE YOU GOT MONEY TO BUY MORE.: NEVER TRUE

## 2024-07-11 SDOH — ECONOMIC STABILITY: INCOME INSECURITY: HOW HARD IS IT FOR YOU TO PAY FOR THE VERY BASICS LIKE FOOD, HOUSING, MEDICAL CARE, AND HEATING?: NOT VERY HARD

## 2024-07-11 ASSESSMENT — ENCOUNTER SYMPTOMS
NAUSEA: 0
BLURRED VISION: 0
VOMITING: 0
FOREIGN BODY SENSATION: 0
EYE ITCHING: 1
EYE REDNESS: 1
DOUBLE VISION: 0
PHOTOPHOBIA: 0
EYE DISCHARGE: 1

## 2024-07-11 ASSESSMENT — PATIENT HEALTH QUESTIONNAIRE - PHQ9
9. THOUGHTS THAT YOU WOULD BE BETTER OFF DEAD, OR OF HURTING YOURSELF: NOT AT ALL
1. LITTLE INTEREST OR PLEASURE IN DOING THINGS: NOT AT ALL
10. IF YOU CHECKED OFF ANY PROBLEMS, HOW DIFFICULT HAVE THESE PROBLEMS MADE IT FOR YOU TO DO YOUR WORK, TAKE CARE OF THINGS AT HOME, OR GET ALONG WITH OTHER PEOPLE: NOT DIFFICULT AT ALL
8. MOVING OR SPEAKING SO SLOWLY THAT OTHER PEOPLE COULD HAVE NOTICED. OR THE OPPOSITE, BEING SO FIGETY OR RESTLESS THAT YOU HAVE BEEN MOVING AROUND A LOT MORE THAN USUAL: NOT AT ALL
6. FEELING BAD ABOUT YOURSELF - OR THAT YOU ARE A FAILURE OR HAVE LET YOURSELF OR YOUR FAMILY DOWN: NOT AT ALL
SUM OF ALL RESPONSES TO PHQ QUESTIONS 1-9: 0
5. POOR APPETITE OR OVEREATING: NOT AT ALL
SUM OF ALL RESPONSES TO PHQ QUESTIONS 1-9: 0
SUM OF ALL RESPONSES TO PHQ QUESTIONS 1-9: 0
SUM OF ALL RESPONSES TO PHQ9 QUESTIONS 1 & 2: 0
4. FEELING TIRED OR HAVING LITTLE ENERGY: NOT AT ALL
2. FEELING DOWN, DEPRESSED OR HOPELESS: NOT AT ALL
SUM OF ALL RESPONSES TO PHQ QUESTIONS 1-9: 0
3. TROUBLE FALLING OR STAYING ASLEEP: NOT AT ALL
7. TROUBLE CONCENTRATING ON THINGS, SUCH AS READING THE NEWSPAPER OR WATCHING TELEVISION: NOT AT ALL

## 2024-07-11 NOTE — PROGRESS NOTES
Shaggy Deleon, was evaluated through a synchronous (real-time) audio-video encounter. The patient (or guardian if applicable) is aware that this is a billable service, which includes applicable co-pays. This Virtual Visit was conducted with patient's (and/or legal guardian's) consent. Patient identification was verified, and a caregiver was present when appropriate.   The patient was located at Home: [address] [place] [id number]  Provider was located at Home (Appt Dept State): [place]  Confirm you are appropriately licensed, registered, or certified to deliver care in the state where the patient is located as indicated above. If you are not or unsure, please re-schedule the visit: Yes, I confirm.     Shaggy Deleon (:  1995) is a Established patient, presenting virtually for evaluation of the following:    Assessment & Plan   Below is the assessment and plan developed based on review of pertinent history, physical exam, labs, studies, and medications.  1. Acute bacterial conjunctivitis of left eye  -     trimethoprim-polymyxin b (POLYTRIM) 10121-8.1 UNIT/ML-% ophthalmic solution; Place 1 drop into the left eye every 6 hours, Disp-10 mL, R-0Normal    Start eye drops  Start cool compresses to left eyelid   Avoid contact use while symptoms present  If no improvement in 2-3 days see eye doctor          Subjective   Eye Problem   The left eye is affected. This is a new problem. The current episode started yesterday. The problem occurs constantly. The problem has been gradually worsening. Injury mechanism: did get dirty water in the eye the day prior to symptoms onsent. There is No known exposure to pink eye. She Wears contacts. Associated symptoms include an eye discharge, eye redness and itching. Pertinent negatives include no blurred vision, double vision, fever, foreign body sensation, nausea, photophobia, recent URI or vomiting. She has tried commercial eye wash for the symptoms. The treatment

## 2024-07-11 NOTE — PATIENT INSTRUCTIONS
Start eye drops  Start cool compresses to left eyelid   Avoid contact use while symptoms present  If no improvement in 2-3 days see eye doctor

## 2024-07-11 NOTE — TELEPHONE ENCOUNTER
From: Shaggy Deleon  To: Macey Lulu Paiz  Sent: 7/10/2024 11:55 PM EDT  Subject: Swollen Eye    Collin RiveraMacey,  I woke up this morning (Wednesday) with a swollen and dry eye. It got a little better mid day and started to bother me more this evening. I’m pretty certain it’s pink eye. There is light discharge and the white of my eye is red.    Yesterday (Tuesday) I was spraying out my kids water table, which was pretty gross and accidentally splashed the dirty water in my eye. I’m wondering if this could be the cause?     I’ve attached a picture for your reference. Is there anyway drops could be called in without me coming in to be seen?

## 2024-09-24 ENCOUNTER — OFFICE VISIT (OUTPATIENT)
Dept: FAMILY MEDICINE CLINIC | Age: 29
End: 2024-09-24
Payer: COMMERCIAL

## 2024-09-24 VITALS
OXYGEN SATURATION: 98 % | RESPIRATION RATE: 16 BRPM | DIASTOLIC BLOOD PRESSURE: 77 MMHG | SYSTOLIC BLOOD PRESSURE: 114 MMHG | HEART RATE: 80 BPM | BODY MASS INDEX: 31.57 KG/M2 | WEIGHT: 204 LBS | TEMPERATURE: 98.4 F

## 2024-09-24 DIAGNOSIS — R05.8 OTHER COUGH: Primary | ICD-10-CM

## 2024-09-24 DIAGNOSIS — H66.003 NON-RECURRENT ACUTE SUPPURATIVE OTITIS MEDIA OF BOTH EARS WITHOUT SPONTANEOUS RUPTURE OF TYMPANIC MEMBRANES: ICD-10-CM

## 2024-09-24 LAB
INFLUENZA A ANTIGEN, POC: NORMAL
INFLUENZA B ANTIGEN, POC: NORMAL
Lab: 0
PERFORMING INSTRUMENT: NORMAL
QC PASS/FAIL: NORMAL
SARS-COV-2, POC: NORMAL

## 2024-09-24 PROCEDURE — 87426 SARSCOV CORONAVIRUS AG IA: CPT | Performed by: SURGERY

## 2024-09-24 PROCEDURE — 87804 INFLUENZA ASSAY W/OPTIC: CPT | Performed by: SURGERY

## 2024-09-24 PROCEDURE — 99213 OFFICE O/P EST LOW 20 MIN: CPT | Performed by: SURGERY

## 2024-09-24 SDOH — ECONOMIC STABILITY: FOOD INSECURITY: WITHIN THE PAST 12 MONTHS, YOU WORRIED THAT YOUR FOOD WOULD RUN OUT BEFORE YOU GOT MONEY TO BUY MORE.: NEVER TRUE

## 2024-09-24 SDOH — ECONOMIC STABILITY: FOOD INSECURITY: WITHIN THE PAST 12 MONTHS, THE FOOD YOU BOUGHT JUST DIDN'T LAST AND YOU DIDN'T HAVE MONEY TO GET MORE.: NEVER TRUE

## 2024-09-24 SDOH — ECONOMIC STABILITY: INCOME INSECURITY: HOW HARD IS IT FOR YOU TO PAY FOR THE VERY BASICS LIKE FOOD, HOUSING, MEDICAL CARE, AND HEATING?: NOT HARD AT ALL

## 2024-09-24 ASSESSMENT — PATIENT HEALTH QUESTIONNAIRE - PHQ9
9. THOUGHTS THAT YOU WOULD BE BETTER OFF DEAD, OR OF HURTING YOURSELF: NOT AT ALL
2. FEELING DOWN, DEPRESSED OR HOPELESS: NOT AT ALL
SUM OF ALL RESPONSES TO PHQ QUESTIONS 1-9: 0
SUM OF ALL RESPONSES TO PHQ9 QUESTIONS 1 & 2: 0
3. TROUBLE FALLING OR STAYING ASLEEP: NOT AT ALL
5. POOR APPETITE OR OVEREATING: NOT AT ALL
SUM OF ALL RESPONSES TO PHQ QUESTIONS 1-9: 0
1. LITTLE INTEREST OR PLEASURE IN DOING THINGS: NOT AT ALL
10. IF YOU CHECKED OFF ANY PROBLEMS, HOW DIFFICULT HAVE THESE PROBLEMS MADE IT FOR YOU TO DO YOUR WORK, TAKE CARE OF THINGS AT HOME, OR GET ALONG WITH OTHER PEOPLE: NOT DIFFICULT AT ALL
SUM OF ALL RESPONSES TO PHQ QUESTIONS 1-9: 0
8. MOVING OR SPEAKING SO SLOWLY THAT OTHER PEOPLE COULD HAVE NOTICED. OR THE OPPOSITE, BEING SO FIGETY OR RESTLESS THAT YOU HAVE BEEN MOVING AROUND A LOT MORE THAN USUAL: NOT AT ALL
SUM OF ALL RESPONSES TO PHQ QUESTIONS 1-9: 0
4. FEELING TIRED OR HAVING LITTLE ENERGY: NOT AT ALL
6. FEELING BAD ABOUT YOURSELF - OR THAT YOU ARE A FAILURE OR HAVE LET YOURSELF OR YOUR FAMILY DOWN: NOT AT ALL
7. TROUBLE CONCENTRATING ON THINGS, SUCH AS READING THE NEWSPAPER OR WATCHING TELEVISION: NOT AT ALL